# Patient Record
Sex: MALE | Race: WHITE | NOT HISPANIC OR LATINO | Employment: OTHER | URBAN - METROPOLITAN AREA
[De-identification: names, ages, dates, MRNs, and addresses within clinical notes are randomized per-mention and may not be internally consistent; named-entity substitution may affect disease eponyms.]

---

## 2017-01-25 DIAGNOSIS — E78.1 PURE HYPERGLYCERIDEMIA: ICD-10-CM

## 2017-01-25 DIAGNOSIS — E78.5 HYPERLIPIDEMIA WITH TARGET LDL LESS THAN 130: Primary | ICD-10-CM

## 2017-01-25 DIAGNOSIS — M43.6 TORTICOLLIS: ICD-10-CM

## 2017-03-07 DIAGNOSIS — Z00.00 ROUTINE GENERAL MEDICAL EXAMINATION AT A HEALTH CARE FACILITY: ICD-10-CM

## 2017-03-07 LAB
ALBUMIN SERPL-MCNC: 3.9 G/DL (ref 3.4–5)
ALP SERPL-CCNC: 61 U/L (ref 40–150)
ALT SERPL W P-5'-P-CCNC: 45 U/L (ref 0–70)
ANION GAP SERPL CALCULATED.3IONS-SCNC: 6 MMOL/L (ref 3–14)
AST SERPL W P-5'-P-CCNC: 25 U/L (ref 0–45)
BASOPHILS # BLD AUTO: 0 10E9/L (ref 0–0.2)
BASOPHILS NFR BLD AUTO: 0.2 %
BILIRUB DIRECT SERPL-MCNC: 0.2 MG/DL (ref 0–0.2)
BILIRUB SERPL-MCNC: 0.6 MG/DL (ref 0.2–1.3)
BUN SERPL-MCNC: 21 MG/DL (ref 7–30)
CALCIUM SERPL-MCNC: 9.1 MG/DL (ref 8.5–10.1)
CHLORIDE SERPL-SCNC: 105 MMOL/L (ref 94–109)
CHOLEST SERPL-MCNC: 105 MG/DL
CO2 SERPL-SCNC: 31 MMOL/L (ref 20–32)
CREAT SERPL-MCNC: 1 MG/DL (ref 0.66–1.25)
DIFFERENTIAL METHOD BLD: ABNORMAL
EOSINOPHIL # BLD AUTO: 0.3 10E9/L (ref 0–0.7)
EOSINOPHIL NFR BLD AUTO: 3 %
ERYTHROCYTE [DISTWIDTH] IN BLOOD BY AUTOMATED COUNT: 13 % (ref 10–15)
GFR SERPL CREATININE-BSD FRML MDRD: 73 ML/MIN/1.7M2
GLUCOSE SERPL-MCNC: 96 MG/DL (ref 70–99)
HBA1C MFR BLD: 5.4 % (ref 4.3–6)
HCT VFR BLD AUTO: 46.1 % (ref 40–53)
HDLC SERPL-MCNC: 54 MG/DL
HGB BLD-MCNC: 15.6 G/DL (ref 13.3–17.7)
LDLC SERPL CALC-MCNC: 39 MG/DL
LYMPHOCYTES # BLD AUTO: 3.5 10E9/L (ref 0.8–5.3)
LYMPHOCYTES NFR BLD AUTO: 40.7 %
MCH RBC QN AUTO: 33.5 PG (ref 26.5–33)
MCHC RBC AUTO-ENTMCNC: 33.8 G/DL (ref 31.5–36.5)
MCV RBC AUTO: 99 FL (ref 78–100)
MONOCYTES # BLD AUTO: 1 10E9/L (ref 0–1.3)
MONOCYTES NFR BLD AUTO: 11.8 %
NEUTROPHILS # BLD AUTO: 3.9 10E9/L (ref 1.6–8.3)
NEUTROPHILS NFR BLD AUTO: 44.3 %
NONHDLC SERPL-MCNC: 51 MG/DL
PLATELET # BLD AUTO: 169 10E9/L (ref 150–450)
POTASSIUM SERPL-SCNC: 4.2 MMOL/L (ref 3.4–5.3)
PROT SERPL-MCNC: 7.6 G/DL (ref 6.8–8.8)
PSA SERPL-ACNC: 5.13 UG/L (ref 0–4)
RBC # BLD AUTO: 4.66 10E12/L (ref 4.4–5.9)
SODIUM SERPL-SCNC: 142 MMOL/L (ref 133–144)
T4 FREE SERPL-MCNC: 0.92 NG/DL (ref 0.76–1.46)
TRIGL SERPL-MCNC: 62 MG/DL
TSH SERPL DL<=0.05 MIU/L-ACNC: 0.63 MU/L (ref 0.4–4)
WBC # BLD AUTO: 8.7 10E9/L (ref 4–11)

## 2017-03-07 PROCEDURE — 83036 HEMOGLOBIN GLYCOSYLATED A1C: CPT | Performed by: INTERNAL MEDICINE

## 2017-03-07 PROCEDURE — 80061 LIPID PANEL: CPT | Performed by: INTERNAL MEDICINE

## 2017-03-07 PROCEDURE — 84439 ASSAY OF FREE THYROXINE: CPT | Performed by: INTERNAL MEDICINE

## 2017-03-07 PROCEDURE — G0103 PSA SCREENING: HCPCS | Performed by: INTERNAL MEDICINE

## 2017-03-07 PROCEDURE — 85025 COMPLETE CBC W/AUTO DIFF WBC: CPT | Performed by: INTERNAL MEDICINE

## 2017-03-07 PROCEDURE — 84443 ASSAY THYROID STIM HORMONE: CPT | Performed by: INTERNAL MEDICINE

## 2017-03-07 PROCEDURE — 80076 HEPATIC FUNCTION PANEL: CPT | Performed by: INTERNAL MEDICINE

## 2017-03-07 PROCEDURE — 80048 BASIC METABOLIC PNL TOTAL CA: CPT | Performed by: INTERNAL MEDICINE

## 2017-03-07 PROCEDURE — 36415 COLL VENOUS BLD VENIPUNCTURE: CPT | Performed by: INTERNAL MEDICINE

## 2017-03-15 ENCOUNTER — OFFICE VISIT (OUTPATIENT)
Dept: OTHER | Facility: CLINIC | Age: 77
End: 2017-03-15
Attending: INTERNAL MEDICINE
Payer: COMMERCIAL

## 2017-03-15 VITALS
DIASTOLIC BLOOD PRESSURE: 72 MMHG | SYSTOLIC BLOOD PRESSURE: 128 MMHG | HEART RATE: 85 BPM | WEIGHT: 160 LBS | BODY MASS INDEX: 23.63 KG/M2 | OXYGEN SATURATION: 97 %

## 2017-03-15 DIAGNOSIS — R97.20 INCREASED PROSTATE SPECIFIC ANTIGEN (PSA) VELOCITY: ICD-10-CM

## 2017-03-15 DIAGNOSIS — Z00.00 ROUTINE GENERAL MEDICAL EXAMINATION AT A HEALTH CARE FACILITY: Primary | ICD-10-CM

## 2017-03-15 DIAGNOSIS — E78.5 HYPERLIPIDEMIA WITH TARGET LDL LESS THAN 130: ICD-10-CM

## 2017-03-15 PROCEDURE — 99397 PER PM REEVAL EST PAT 65+ YR: CPT | Mod: ZP | Performed by: INTERNAL MEDICINE

## 2017-03-15 PROCEDURE — 99213 OFFICE O/P EST LOW 20 MIN: CPT | Mod: ZP | Performed by: INTERNAL MEDICINE

## 2017-03-15 PROCEDURE — 99211 OFF/OP EST MAY X REQ PHY/QHP: CPT

## 2017-03-15 RX ORDER — FLUOROURACIL 50 MG/G
CREAM TOPICAL
Refills: 0 | COMMUNITY
Start: 2016-10-24

## 2017-03-15 RX ORDER — ATORVASTATIN CALCIUM 40 MG/1
40 TABLET, FILM COATED ORAL DAILY
Qty: 90 TABLET | Refills: 3 | Status: SHIPPED
Start: 2017-03-15 | End: 2019-03-12

## 2017-03-15 NOTE — PROGRESS NOTES
SUBJECTIVE:    CC: Ej Parra is a 76 year old male who presents for:       Routine general medical examination at a health care facility  Hyperlipidemia with target LDL less than 130  Increased prostate specific antigen (PSA) velocity          HISTORIES:    PROBLEM LIST:                   Patient Active Problem List   Diagnosis     Torticollis     Pure hyperglyceridemia     Hyperlipidemia with target LDL less than 130       PAST MEDICAL HISTORY:                  Past Medical History   Diagnosis Date     Allergic rhinitis, cause unspecified      Esophageal reflux      Hyperlipidemia LDL goal < 130        PAST SURGICAL HISTORY:                  Past Surgical History   Procedure Laterality Date     C nonspecific procedure  1982     appendectomy     C nonspecific procedure  1947     T&A       CURRENT MEDICATIONS:                  Current Outpatient Prescriptions   Medication Sig Dispense Refill     fluorouracil (EFUDEX) 5 % cream   0     atorvastatin (LIPITOR) 40 MG tablet Take 1 tablet (40 mg) by mouth daily 90 tablet 3     order for DME Equipment being ordered: Ankle Air Cast Splint 1 each 0     glucosamine-chondroitin 500-400 MG CAPS Take 1 capsule by mouth daily       [DISCONTINUED] atorvastatin (LIPITOR) 40 MG tablet Take 1 tablet (40 mg) by mouth daily 90 tablet 3       ALLERGIES:                  Allergies   Allergen Reactions     Niaspan [Niacin] Rash       SOCIAL HISTORY:                  Social History     Social History     Marital status:      Spouse name: Milagros     Number of children: 2     Years of education: 15     Occupational History      [employer]     worked as an  for [employer]     Social History Main Topics     Smoking status: Former Smoker     Smokeless tobacco: Never Used     Alcohol use No     Drug use: No     Sexual activity: Not on file     Other Topics Concern     Not on file     Social History Narrative       FAMILY HISTORY:                   Family  History   Problem Relation Age of Onset     Cardiovascular Father      heart valve problems,  at age 84     Circulatory Father      aortic aneurysm     CEREBROVASCULAR DISEASE Father      Cardiovascular Mother       of CHF at age 94     CEREBROVASCULAR DISEASE Mother      several     Cardiovascular Brother      valve replacement in his 70s     Arthritis Brother      Genetic Disorder Other      Leiden mutation     Genetic Disorder Other      Leiden mutation                             REVIEW OF SYSTEMS:  CONSTITUTIONAL:no malaise, fatigue, or other general symptoms  EYES: no subjective changes in visual acuity, no photophobia  ENT/MOUTH: no complaints of rhinorrhea, nasal congestion, sore throat, hearing changes  RESP:no SOB  CV: no c/o exertional chest pressure or KRAUSE  GI: No abdominal pain, constipation, change in bowel movements, nausea, pyrosis, BRBPR  :no polyuria or polydipsia, no dysuria, no gross hematuria  MUSCULOSKELATAL:no arthalgias or myalgias  INTEGUMENTARY/SKIN: no pruritis, rashes, or moles with recent change in size, shape, or pigmentation  BREAST: no lumps, masses, or discharges  NEURO: no gross sensory or motor symptoms, no dizziness, no confusion  ENDOCRINE: no polyuria or polydipsia, no heat or cold intolerance  HEME/ALLERGY/IMMUNE: no fevers, chills, night sweats, or unwanted weight loss  PSYCHIATRIC: no depression, anxiety, or internal stimuli    EXAM:    /77 (BP Location: Right arm, Patient Position: Chair, Cuff Size: Adult Large)  Pulse 85  Wt 160 lb (72.6 kg)  SpO2 97%  BMI 23.63 kg/m2  BMI: Body mass index is 23.63 kg/(m^2).  GENERAL APPEARANCE: healthy, alert and no distress   EXAM:  EYES: clear conjunctiva, no cataracts, no obvious fundoscopic abnormalities  HENT: oropharynx, nares, and TMs are WNL  NECK: no JVD, thyromegaly or lymphadenopathy, no cervical bruits  RESP: clear to auscultation without rales, wheezes, or rhonchi  CV: RRR, no murmurs, gallops, or rubs  LYMPH:  no cervical , axillary, or inguinal lymphadenopathy appreciated  GI: NABS, ND/NT, no masses or organomegally appreciated  : normal external genitalia and anus, no lumps, masses, or tenderness noted on palpation of the normally sized prostate  MS: no obvoius clinicallly relevant arthropathy, no evidence vasculitis  SKIN: no nevi clinically suspicious for malignancy are noted  NEURO: CN II-XII intact, no localizing sensory or motor abnoramlities noted, DTRs symmetrical bilaterally  PSYCH: Mental status exam reveals the pt to have normal mood and affect. There is no disorder of thought form or content. There is no response to internal stimuli. There is no suicidal or homicidal ideation.         (Z00.00) Routine general medical examination at a health care facility  (primary encounter diagnosis)  Comment: PSA increasing but needs colonosocpy so ARTURO deferred  Plan: GASTROENTEROLOGY ADULT REF PROCEDURE ONLY,         Follow-Up with Vascular Medicine, CBC with         platelets differential, T3 Free, T4 free, TSH,         Hepatic panel, Basic metabolic panel, Lipid         Profile, Hemoglobin A1c, Prostate spec antigen         screen            (E78.5) Hyperlipidemia with target LDL less than 130  Comment: at goal  Plan: atorvastatin (LIPITOR) 40 MG tablet,         OFFICE/OUTPT VISIT,EST,LEVL III, Follow-Up with        Vascular Medicine, Lipid Profile           (R97.20) Increased prostate specific antigen (PSA) velocity  Comment: PSA increasing but needs colonosocpy so ARTURO deferred, recheck below in six months, if nodule found on ARTURO at colonosocpy , then refer to   Plan: OFFICE/OUTPT VISIT,EST,LEVL III, Follow-Up with        Vascular Medicine, PSA total and free                                  Greater than one half the 15 minutes not spent on preventive care during this visit was spent providing aducation and counselling regarding item(s) # 2 onward as delineated above.     I have discussed with patient the risks,  benefits, medications, treatment options and modalities.   I have instructed the patient to call or schedule a follow-up appointment if any problems or failure to improve.

## 2017-03-15 NOTE — NURSING NOTE
"Chief Complaint   Patient presents with     RECHECK     follow up fasting labs       Initial /77 (BP Location: Right arm, Patient Position: Chair, Cuff Size: Adult Large)  Pulse 85  Wt 160 lb (72.6 kg)  SpO2 97%  BMI 23.63 kg/m2 Estimated body mass index is 23.63 kg/(m^2) as calculated from the following:    Height as of 3/3/16: 5' 9\" (1.753 m).    Weight as of this encounter: 160 lb (72.6 kg).  Medication Reconciliation: complete     Face to face nursing time: 8 minutes    Cindy Hernandez MA     "

## 2017-03-15 NOTE — MR AVS SNAPSHOT
After Visit Summary   3/15/2017    Ej Parra    MRN: [id number]           Patient Information     Date Of Birth          1940        Visit Information        Provider Department      3/15/2017 10:30 AM Zachary Aldridge MD [facility]      Today's Diagnoses     Routine general medical examination at a health care facility    -  1    Hyperlipidemia with target LDL less than 130        Increased prostate specific antigen (PSA) velocity           Follow-ups after your visit        Additional Services     GASTROENTEROLOGY ADULT REF PROCEDURE ONLY       Last Lab Result: Creatinine (mg/dL)       Date                     Value                 03/07/2017               1.00             ----------  Body mass index is 23.63 kg/(m^2).     Needed:  No  Language:  English    Patient will be contacted to schedule procedure.     Please be aware that coverage of these services is subject to the terms and limitations of your health insurance plan.  Call member services at your health plan with any benefit or coverage questions.  Any procedures must be performed at a [facility] facility OR coordinated by your clinic's referral office.    Please bring the following with you to your appointment:    (1) Any X-Rays, CTs or MRIs which have been performed.  Contact the facility where they were done to arrange for  prior to your scheduled appointment.    (2) List of current medications   (3) This referral request   (4) Any documents/labs given to you for this referral            Follow-Up with Vascular Medicine                 Who to contact     If you have questions or need follow up information about today's clinic visit or your schedule please contact [facility] directly at [phone].  Normal or non-critical lab and imaging results will be communicated to you by MyChart, letter or phone within 4  business days after the clinic has received the results. If you do not hear from us within 7 days, please contact the clinic through [employer] or phone. If you have a critical or abnormal lab result, we will notify you by phone as soon as possible.  Submit refill requests through [employer] or call your pharmacy and they will forward the refill request to us. Please allow 3 business days for your refill to be completed.          Additional Information About Your Visit        [employer] Information     [employer] gives you secure access to your electronic health record. If you see a primary care provider, you can also send messages to your care team and make appointments. If you have questions, please call your primary care clinic.  If you do not have a primary care provider, please call [phone] and they will assist you.        Care EveryWhere ID     This is your Care EveryWhere ID. This could be used by other organizations to access your [facility] medical records  [id number]        Your Vitals Were     Pulse Pulse Oximetry BMI (Body Mass Index)             85 97% 23.63 kg/m2          Blood Pressure from Last 3 Encounters:   03/15/17 128/72   03/03/16 126/76   02/01/16 138/79    Weight from Last 3 Encounters:   03/15/17 160 lb (72.6 kg)   03/03/16 161 lb (73 kg)   02/01/16 166 lb (75.3 kg)              We Performed the Following     Follow-Up with Vascular Medicine     GASTROENTEROLOGY ADULT REF PROCEDURE ONLY     OFFICE/OUTPT VISIT,EST,LEVL III          Where to get your medicines      [facility]     Phone:  [phone]     atorvastatin 40 MG tablet          Primary Care Provider Office Phone # Fax #    Zachary Aldridge [phone]       [facility] [address]  [place] [address]        Thank you!     Thank you for choosing [facility] [place]  for your care. Our goal is always to provide you with  excellent care. Hearing back from our patients is one way we can continue to improve our services. Please take a few minutes to complete the written survey that you may receive in the mail after your visit with us. Thank you!             Your Updated Medication List - Protect others around you: Learn how to safely use, store and throw away your medicines at www.disposemymeds.org.          This list is accurate as of: 3/15/17 11:59 PM.  Always use your most recent med list.                   Brand Name Dispense Instructions for use    atorvastatin 40 MG tablet    LIPITOR    90 tablet    Take 1 tablet (40 mg) by mouth daily       fluorouracil 5 % cream    EFUDEX         glucosamine-chondroitin 500-400 MG Caps per capsule      Take 1 capsule by mouth daily       order for DME     1 each    Equipment being ordered: Ankle Air Cast Splint

## 2017-04-03 ENCOUNTER — TRANSFERRED RECORDS (OUTPATIENT)
Dept: HEALTH INFORMATION MANAGEMENT | Facility: CLINIC | Age: 77
End: 2017-04-03

## 2017-08-01 LAB
ALBUMIN SERPL-MCNC: 3.7 G/DL (ref 3.4–5)
ALP SERPL-CCNC: 61 U/L (ref 45–117)
ALT SERPL-CCNC: 37 U/L (ref 13–61)
APPEARANCE UR: CLEAR
AST SERPL-CCNC: 27 U/L (ref 15–37)
BACTERIA, UR: NORMAL
BILIRUB SERPL-MCNC: 0.4 MG/DL (ref 0.2–1)
BUN SERPL-MCNC: 20 MG/DL (ref 8–26)
CALCIUM SERPL-MCNC: 9.1 MG/DL (ref 8.5–10.1)
CHOLEST SERPL-MCNC: 96 MG/DL (ref 0–200)
CREAT SERPL-MCNC: 0.9 MG/DL (ref 0.7–1.2)
GLUCOSE SERPL-MCNC: 89 MG/DL (ref 74–106)
GLUCOSE URINE: NORMAL MG/DL
HBA1C MFR BLD: 5.1 % (ref 0–5.7)
HCT VFR BLD AUTO: 44.2 % (ref 41–54)
HDLC SERPL-MCNC: 54 MG/DL
HEMOGLOBIN: 15.1 G/DL (ref 13.5–17.9)
HGB UR QL: NORMAL
KETONES UR QL: NORMAL MG/DL
LDLC SERPL CALC-MCNC: 32 MG/DL
MAGNESIUM SERPL-MCNC: 2.2 MG/DL (ref 1.6–2.6)
NITRITE UR QL STRIP: NORMAL
PLATELET # BLD AUTO: 192 10^9/L (ref 150–400)
POTASSIUM SERPL-SCNC: 4.2 MMOL/L (ref 3.5–5)
PROT SERPL-MCNC: 7.2 G/DL (ref 6.4–8.2)
PROT UR QL: NORMAL MG/DL
PSA SERPL-MCNC: 4.69 NG/ML (ref 0–4)
RBC, UR MICRO: 1 (ref ?–2)
SODIUM SERPL-SCNC: 139 MMOL/L (ref 136–145)
TRIGL SERPL-MCNC: 48 MG/DL (ref 0–150)
TSH SERPL-ACNC: 0.55 MCU/ML (ref 0.3–5)
VITAMIN D TOTAL: 36 NG/ML
WBC # BLD AUTO: 6.86 10^9/L (ref 4–11)
WBC, UR MICRO: NORMAL (ref ?–2)

## 2018-03-05 DIAGNOSIS — E78.5 HYPERLIPIDEMIA WITH TARGET LDL LESS THAN 130: ICD-10-CM

## 2018-03-05 DIAGNOSIS — Z00.00 ROUTINE GENERAL MEDICAL EXAMINATION AT A HEALTH CARE FACILITY: ICD-10-CM

## 2018-03-05 LAB
ALBUMIN SERPL-MCNC: 3.9 G/DL (ref 3.4–5)
ALP SERPL-CCNC: 58 U/L (ref 40–150)
ALT SERPL W P-5'-P-CCNC: 39 U/L (ref 0–70)
ANION GAP SERPL CALCULATED.3IONS-SCNC: 3 MMOL/L (ref 3–14)
AST SERPL W P-5'-P-CCNC: 31 U/L (ref 0–45)
BASOPHILS # BLD AUTO: 0 10E9/L (ref 0–0.2)
BASOPHILS NFR BLD AUTO: 0.5 %
BILIRUB DIRECT SERPL-MCNC: 0.1 MG/DL (ref 0–0.2)
BILIRUB SERPL-MCNC: 0.4 MG/DL (ref 0.2–1.3)
BUN SERPL-MCNC: 18 MG/DL (ref 7–30)
CALCIUM SERPL-MCNC: 9.1 MG/DL (ref 8.5–10.1)
CHLORIDE SERPL-SCNC: 104 MMOL/L (ref 94–109)
CHOLEST SERPL-MCNC: 110 MG/DL
CO2 SERPL-SCNC: 33 MMOL/L (ref 20–32)
CREAT SERPL-MCNC: 1.07 MG/DL (ref 0.66–1.25)
DIFFERENTIAL METHOD BLD: NORMAL
EOSINOPHIL # BLD AUTO: 0.2 10E9/L (ref 0–0.7)
EOSINOPHIL NFR BLD AUTO: 2.6 %
ERYTHROCYTE [DISTWIDTH] IN BLOOD BY AUTOMATED COUNT: 13 % (ref 10–15)
GFR SERPL CREATININE-BSD FRML MDRD: 67 ML/MIN/1.7M2
GLUCOSE SERPL-MCNC: 93 MG/DL (ref 70–99)
HBA1C MFR BLD: 5.6 % (ref 4.3–6)
HCT VFR BLD AUTO: 46.6 % (ref 40–53)
HDLC SERPL-MCNC: 55 MG/DL
HGB BLD-MCNC: 15.1 G/DL (ref 13.3–17.7)
LDLC SERPL CALC-MCNC: 42 MG/DL
LYMPHOCYTES # BLD AUTO: 3.7 10E9/L (ref 0.8–5.3)
LYMPHOCYTES NFR BLD AUTO: 44.4 %
MCH RBC QN AUTO: 32.1 PG (ref 26.5–33)
MCHC RBC AUTO-ENTMCNC: 32.4 G/DL (ref 31.5–36.5)
MCV RBC AUTO: 99 FL (ref 78–100)
MONOCYTES # BLD AUTO: 1 10E9/L (ref 0–1.3)
MONOCYTES NFR BLD AUTO: 11.9 %
NEUTROPHILS # BLD AUTO: 3.4 10E9/L (ref 1.6–8.3)
NEUTROPHILS NFR BLD AUTO: 40.6 %
NONHDLC SERPL-MCNC: 55 MG/DL
PLATELET # BLD AUTO: 171 10E9/L (ref 150–450)
POTASSIUM SERPL-SCNC: 4.1 MMOL/L (ref 3.4–5.3)
PROT SERPL-MCNC: 7.6 G/DL (ref 6.8–8.8)
PSA SERPL-ACNC: 4.67 UG/L (ref 0–4)
RBC # BLD AUTO: 4.7 10E12/L (ref 4.4–5.9)
SODIUM SERPL-SCNC: 140 MMOL/L (ref 133–144)
T3FREE SERPL-MCNC: 3.2 PG/ML (ref 2.3–4.2)
T4 FREE SERPL-MCNC: 0.88 NG/DL (ref 0.76–1.46)
TRIGL SERPL-MCNC: 67 MG/DL
TSH SERPL DL<=0.005 MIU/L-ACNC: 1.54 MU/L (ref 0.4–4)
WBC # BLD AUTO: 8.4 10E9/L (ref 4–11)

## 2018-03-05 PROCEDURE — 84443 ASSAY THYROID STIM HORMONE: CPT | Performed by: INTERNAL MEDICINE

## 2018-03-05 PROCEDURE — 84439 ASSAY OF FREE THYROXINE: CPT | Performed by: INTERNAL MEDICINE

## 2018-03-05 PROCEDURE — 80061 LIPID PANEL: CPT | Performed by: INTERNAL MEDICINE

## 2018-03-05 PROCEDURE — 83036 HEMOGLOBIN GLYCOSYLATED A1C: CPT | Performed by: INTERNAL MEDICINE

## 2018-03-05 PROCEDURE — 36415 COLL VENOUS BLD VENIPUNCTURE: CPT | Performed by: INTERNAL MEDICINE

## 2018-03-05 PROCEDURE — 85025 COMPLETE CBC W/AUTO DIFF WBC: CPT | Performed by: INTERNAL MEDICINE

## 2018-03-05 PROCEDURE — G0103 PSA SCREENING: HCPCS | Performed by: INTERNAL MEDICINE

## 2018-03-05 PROCEDURE — 84481 FREE ASSAY (FT-3): CPT | Performed by: INTERNAL MEDICINE

## 2018-03-05 PROCEDURE — 80048 BASIC METABOLIC PNL TOTAL CA: CPT | Performed by: INTERNAL MEDICINE

## 2018-03-05 PROCEDURE — 80076 HEPATIC FUNCTION PANEL: CPT | Performed by: INTERNAL MEDICINE

## 2018-03-12 ENCOUNTER — OFFICE VISIT (OUTPATIENT)
Dept: OTHER | Facility: CLINIC | Age: 78
End: 2018-03-12
Attending: INTERNAL MEDICINE
Payer: COMMERCIAL

## 2018-03-12 VITALS
BODY MASS INDEX: 22.65 KG/M2 | WEIGHT: 161.8 LBS | SYSTOLIC BLOOD PRESSURE: 128 MMHG | HEIGHT: 71 IN | HEART RATE: 89 BPM | DIASTOLIC BLOOD PRESSURE: 78 MMHG | OXYGEN SATURATION: 99 % | RESPIRATION RATE: 12 BRPM

## 2018-03-12 DIAGNOSIS — E78.5 HYPERLIPIDEMIA WITH TARGET LDL LESS THAN 130: ICD-10-CM

## 2018-03-12 DIAGNOSIS — Z00.00 ROUTINE GENERAL MEDICAL EXAMINATION AT A HEALTH CARE FACILITY: ICD-10-CM

## 2018-03-12 DIAGNOSIS — R97.20 INCREASED PROSTATE SPECIFIC ANTIGEN (PSA) VELOCITY: ICD-10-CM

## 2018-03-12 PROCEDURE — G0463 HOSPITAL OUTPT CLINIC VISIT: HCPCS

## 2018-03-12 PROCEDURE — 99213 OFFICE O/P EST LOW 20 MIN: CPT | Mod: ZP | Performed by: INTERNAL MEDICINE

## 2018-03-12 PROCEDURE — 99397 PER PM REEVAL EST PAT 65+ YR: CPT | Mod: ZP | Performed by: INTERNAL MEDICINE

## 2018-03-12 NOTE — MR AVS SNAPSHOT
After Visit Summary   3/12/2018    Ej Parar    MRN: [id number]           Patient Information     Date Of Birth          1940        Visit Information        Provider Department      3/12/2018 1:00 PM Zachary Aldridge MD [facility] [place]      Today's Diagnoses     Routine general medical examination at a health care facility        Hyperlipidemia with target LDL less than 130        Increased prostate specific antigen (PSA) with dcreasing PSA velocity           Follow-ups after your visit        Additional Services     Follow-Up with Vascular Medicine                 Future tests that were ordered for you today     Open Future Orders        Priority Expected Expires Ordered    Follow-Up with Vascular Medicine Routine 3/12/2019 3/12/2019 3/12/2018    CBC with platelets differential Routine 3/12/2019 3/12/2019 3/12/2018    T3 Free Routine 3/12/2019 3/12/2019 3/12/2018    T4 free Routine 3/12/2019 3/12/2019 3/12/2018    TSH Routine 3/12/2019 3/12/2019 3/12/2018    Basic metabolic panel Routine 3/12/2019 3/12/2019 3/12/2018    Hepatic panel Routine 3/12/2019 3/12/2019 3/12/2018    Lipid Profile Routine 3/12/2019 3/12/2019 3/12/2018    Hemoglobin A1c Routine 3/12/2019 3/12/2019 3/12/2018    Prostate spec antigen screen Routine 3/12/2019 3/12/2019 3/12/2018            Who to contact     If you have questions or need follow up information about today's clinic visit or your schedule please contact [facility] directly at [phone].  Normal or non-critical lab and imaging results will be communicated to you by MyChart, letter or phone within 4 business days after the clinic has received the results. If you do not hear from us within 7 days, please contact the clinic through MyChart or phone. If you have a critical or abnormal lab result, we will notify you by phone as soon as possible.  Submit refill  "requests through [employer] or call your pharmacy and they will forward the refill request to us. Please allow 3 business days for your refill to be completed.          Additional Information About Your Visit        [employer] Information     [employer] gives you secure access to your electronic health record. If you see a primary care provider, you can also send messages to your care team and make appointments. If you have questions, please call your primary care clinic.  If you do not have a primary care provider, please call [phone] and they will assist you.        Care EveryWhere ID     This is your Care EveryWhere ID. This could be used by other organizations to access your [facility] medical records  [id number]        Your Vitals Were     Pulse Respirations Height Pulse Oximetry BMI (Body Mass Index)       89 12 5' 10.5\" (1.791 m) 99% 22.89 kg/m2        Blood Pressure from Last 3 Encounters:   03/12/18 128/78   03/15/17 128/72   03/03/16 126/76    Weight from Last 3 Encounters:   03/12/18 161 lb 12.8 oz (73.4 kg)   03/15/17 160 lb (72.6 kg)   03/03/16 161 lb (73 kg)              We Performed the Following     Follow-Up with Vascular Medicine     OFFICE/OUTPT VISIT,VINNY ROPER III        Primary Care Provider Office Phone # Fax #    Zachary Aldridge [phone]       [facility] [address]  [facility] [address]        Equal Access to Services     [facility] AH: Hadii aad ku hadasho Sojacobali, waaxda luqadaha, qaybta kaalmada adeegyada, waxay santiago perales adegena correa . So [facility] [phone].    ATENCIÓN: Si habla español, tiene a hernandez disposición servicios gratuitos de asistencia lingüística. Ann al [phone].    We comply with applicable federal civil rights laws and [place] laws. We do not discriminate on the basis of race, color, national origin, age, disability, sex, sexual orientation, or gender identity.            Thank you!     Thank you for choosing [facility]  for your care. Our goal is always to provide you with excellent care. Hearing back from our patients is one way we can continue to improve our services. Please take a few minutes to complete the written survey that you may receive in the mail after your visit with us. Thank you!             Your Updated Medication List - Protect others around you: Learn how to safely use, store and throw away your medicines at www.disposemymeds.org.          This list is accurate as of 3/12/18  1:45 PM.  Always use your most recent med list.                   Brand Name Dispense Instructions for use Diagnosis    atorvastatin 40 MG tablet    LIPITOR    90 tablet    Take 1 tablet (40 mg) by mouth daily    Hyperlipidemia with target LDL less than 130       fluorouracil 5 % cream    EFUDEX          glucosamine-chondroitin 500-400 MG Caps per capsule      Take 1 capsule by mouth daily        order for DME     1 each    Equipment being ordered: Ankle Air Cast Splint    Sprain of tibiofibular ligament of right ankle, initial encounter, Right ankle pain

## 2018-03-12 NOTE — NURSING NOTE
"Chief Complaint   Patient presents with     RECHECK     follow up labs        Initial /89 (BP Location: Right arm, Patient Position: Chair, Cuff Size: Adult Large)  Pulse 89  Ht 5' 10.5\" (1.791 m)  Wt 161 lb 12.8 oz (73.4 kg)  SpO2 99%  BMI 22.89 kg/m2 Estimated body mass index is 22.89 kg/(m^2) as calculated from the following:    Height as of this encounter: 5' 10.5\" (1.791 m).    Weight as of this encounter: 161 lb 12.8 oz (73.4 kg).  Medication Reconciliation: complete    Kenya Hassan CMA on 3/12/2018 at 12:59 PM    "

## 2018-03-12 NOTE — PROGRESS NOTES
SUBJECTIVE:    CC: Ej Parra is a 77 year old male who presents for:       Routine general medical examination at a health care facility  Hyperlipidemia with target LDL less than 130  Increased prostate specific antigen (PSA) velocity          HISTORIES:    PROBLEM LIST:                   Patient Active Problem List   Diagnosis     Torticollis     Pure hyperglyceridemia     Hyperlipidemia with target LDL less than 130       PAST MEDICAL HISTORY:                  Past Medical History:   Diagnosis Date     Allergic rhinitis, cause unspecified      Esophageal reflux      Hyperlipidemia LDL goal < 130        PAST SURGICAL HISTORY:                  Past Surgical History:   Procedure Laterality Date     C NONSPECIFIC PROCEDURE      appendectomy     C NONSPECIFIC PROCEDURE      T&A       CURRENT MEDICATIONS:                  Current Outpatient Prescriptions   Medication Sig Dispense Refill     fluorouracil (EFUDEX) 5 % cream   0     atorvastatin (LIPITOR) 40 MG tablet Take 1 tablet (40 mg) by mouth daily 90 tablet 3     order for DME Equipment being ordered: Ankle Air Cast Splint 1 each 0     glucosamine-chondroitin 500-400 MG CAPS Take 1 capsule by mouth daily         ALLERGIES:                  Allergies   Allergen Reactions     Niaspan [Niacin] Rash       SOCIAL HISTORY:                  Social History     Social History     Marital status:      Spouse name: Milagros     Number of children: 2     Years of education: 15     Occupational History      [employer]     worked as an  for [employer]     Social History Main Topics     Smoking status: Former Smoker     Smokeless tobacco: Never Used     Alcohol use No     Drug use: No     Sexual activity: Not on file     Other Topics Concern     Not on file     Social History Narrative       FAMILY HISTORY:                   Family History   Problem Relation Age of Onset     Cardiovascular Father      heart valve problems,  at age 84  "    Circulatory Father      aortic aneurysm     CEREBROVASCULAR DISEASE Father      Cardiovascular Mother       of CHF at age 94     CEREBROVASCULAR DISEASE Mother      several     Cardiovascular Brother      valve replacement in his 70s     Arthritis Brother      Genetic Disorder Other      Leiden mutation/Leiden mutation                             REVIEW OF SYSTEMS:  CONSTITUTIONAL:no malaise, fatigue, or other general symptoms  EYES: no subjective changes in visual acuity, no photophobia  ENT/MOUTH: no complaints of rhinorrhea, nasal congestion, sore throat, hearing changes  RESP:no SOB  CV: no c/o exertional chest pressure or KRAUSE  GI: No abdominal pain, constipation, change in bowel movements, nausea, pyrosis, BRBPR  :no polyuria or polydipsia, no dysuria, no gross hematuria  MUSCULOSKELATAL:no arthalgias or myalgias  INTEGUMENTARY/SKIN: no pruritis, rashes, or moles with recent change in size, shape, or pigmentation  BREAST: no lumps, masses, or discharges  NEURO: no gross sensory or motor symptoms, no dizziness, no confusion  ENDOCRINE: no polyuria or polydipsia, no heat or cold intolerance  HEME/ALLERGY/IMMUNE: no fevers, chills, night sweats, or unwanted weight loss  PSYCHIATRIC: no depression, anxiety, or internal stimuli    EXAM:    /78 (BP Location: Right arm, Patient Position: Chair, Cuff Size: Adult Large)  Pulse 89  Resp 12  Ht 5' 10.5\" (1.791 m)  Wt 161 lb 12.8 oz (73.4 kg)  SpO2 99%  BMI 22.89 kg/m2  BMI: Body mass index is 22.89 kg/(m^2).  GENERAL APPEARANCE: healthy, alert and no distress   EXAM:  EYES: clear conjunctiva, no cataracts, no obvious fundoscopic abnormalities  HENT: oropharynx, nares, and TMs are WNL  NECK: no JVD, thyromegaly or lymphadenopathy, no cervical bruits  RESP: clear to auscultation without rales, wheezes, or rhonchi  CV: RRR, no murmurs, gallops, or rubs  LYMPH: no cervical , axillary, or inguinal lymphadenopathy appreciated  GI: NABS, ND/NT, no masses or " organomegally appreciated  : normal external genitalia and anus, no lumps, masses, or tenderness noted on palpation of the normally sized prostate  MS: no obvoius clinicallly relevant arthropathy, no evidence vasculitis  SKIN: no nevi clinically suspicious for malignancy are noted  NEURO: CN II-XII intact, no localizing sensory or motor abnoramlities noted, DTRs symmetrical bilaterally  PSYCH: Mental status exam reveals the pt to have normal mood and affect. There is no disorder of thought form or content. There is no response to internal stimuli. There is no suicidal or homicidal ideation.           Component      Latest Ref Rng & Units 3/7/2017 3/5/2018   WBC      4.0 - 11.0 10e9/L 8.7 8.4   RBC Count      4.4 - 5.9 10e12/L 4.66 4.70   Hemoglobin      13.3 - 17.7 g/dL 15.6 15.1   Hematocrit      40.0 - 53.0 % 46.1 46.6   MCV      78 - 100 fl 99 99   MCH      26.5 - 33.0 pg 33.5 (H) 32.1   MCHC      31.5 - 36.5 g/dL 33.8 32.4   RDW      10.0 - 15.0 % 13.0 13.0   Platelet Count      150 - 450 10e9/L 169 171   Diff Method       Automated Method Automated Method   % Neutrophils      % 44.3 40.6   % Lymphocytes      % 40.7 44.4   % Monocytes      % 11.8 11.9   % Eosinophils      % 3.0 2.6   % Basophils      % 0.2 0.5   Absolute Neutrophil      1.6 - 8.3 10e9/L 3.9 3.4   Absolute Lymphocytes      0.8 - 5.3 10e9/L 3.5 3.7   Absolute Monocytes      0.0 - 1.3 10e9/L 1.0 1.0   Absolute Eosinophils      0.0 - 0.7 10e9/L 0.3 0.2   Absolute Basophils      0.0 - 0.2 10e9/L 0.0 0.0   Sodium      133 - 144 mmol/L 142 140   Potassium      3.4 - 5.3 mmol/L 4.2 4.1   Chloride      94 - 109 mmol/L 105 104   Carbon Dioxide      20 - 32 mmol/L 31 33 (H)   Anion Gap      3 - 14 mmol/L 6 3   Glucose      70 - 99 mg/dL 96 93   Urea Nitrogen      7 - 30 mg/dL 21 18   Creatinine      0.66 - 1.25 mg/dL 1.00 1.07   GFR Estimate      >60 mL/min/1.7m2 73 67   GFR Estimate If Black      >60 mL/min/1.7m2 88 81   Calcium      8.5 - 10.1 mg/dL  9.1 9.1   Bilirubin Direct      0.0 - 0.2 mg/dL 0.2 0.1   Bilirubin Total      0.2 - 1.3 mg/dL 0.6 0.4   Albumin      3.4 - 5.0 g/dL 3.9 3.9   Protein Total      6.8 - 8.8 g/dL 7.6 7.6   Alkaline Phosphatase      40 - 150 U/L 61 58   ALT      0 - 70 U/L 45 39   AST      0 - 45 U/L 25 31   Cholesterol      <200 mg/dL 105 110   Triglycerides      <150 mg/dL 62 67   HDL Cholesterol      >39 mg/dL 54 55   LDL Cholesterol Calculated      <100 mg/dL 39 42   Non HDL Cholesterol      <130 mg/dL 51 55   T4 Free      0.76 - 1.46 ng/dL 0.92 0.88   TSH      0.40 - 4.00 mU/L 0.63 1.54   Hemoglobin A1C      4.3 - 6.0 % 5.4 5.6   PSA      0 - 4 ug/L 5.13 (H) 4.67 (H)   Free T3      2.3 - 4.2 pg/mL  3.2         (Z00.00) Routine general medical examination at a health care facility  Comment: RTC one year, no further colonoscopies needed  Plan: Follow-Up with Vascular Medicine, CBC with         platelets differential, T3 Free, T4 free, TSH,         Basic metabolic panel, Hepatic panel, Lipid         Profile, Hemoglobin A1c, Prostate spec antigen         screen            (E78.5) Hyperlipidemia with target LDL less than 130  Comment: at goal, Lipitor should be continued  Plan: OFFICE/OUTPT VISIT,JACQUELINLEVL III, Follow-Up with        Vascular Medicine, Lipid Profile           (R97.20) Increased prostate specific antigen (PSA) with dcreasing PSA velocity  Comment: this is decreasing, he declines ARTURO as he would decline treatment for prostate cancer while asx  Plan: OFFICE/OUTPT VISIT,ESTLEVL III, Follow-Up with        Vascular Medicine         Greater than one half the 15 minutes not spent on preventive care during this visit was spent providing aducation and counselling regarding item(s) # 2 onward as delineated above.                            I have discussed with patient the risks, benefits, medications, treatment options and modalities.   I have instructed the patient to call or schedule a follow-up appointment if any problems or  failure to improve.

## 2019-03-05 DIAGNOSIS — Z00.00 ROUTINE GENERAL MEDICAL EXAMINATION AT A HEALTH CARE FACILITY: ICD-10-CM

## 2019-03-05 DIAGNOSIS — E78.5 HYPERLIPIDEMIA WITH TARGET LDL LESS THAN 130: ICD-10-CM

## 2019-03-05 LAB
ALBUMIN SERPL-MCNC: 4 G/DL (ref 3.4–5)
ALP SERPL-CCNC: 61 U/L (ref 40–150)
ALT SERPL W P-5'-P-CCNC: 36 U/L (ref 0–70)
ANION GAP SERPL CALCULATED.3IONS-SCNC: 4 MMOL/L (ref 3–14)
AST SERPL W P-5'-P-CCNC: 25 U/L (ref 0–45)
BASOPHILS # BLD AUTO: 0 10E9/L (ref 0–0.2)
BASOPHILS NFR BLD AUTO: 0.4 %
BILIRUB DIRECT SERPL-MCNC: 0.2 MG/DL (ref 0–0.2)
BILIRUB SERPL-MCNC: 0.5 MG/DL (ref 0.2–1.3)
BUN SERPL-MCNC: 17 MG/DL (ref 7–30)
CALCIUM SERPL-MCNC: 9.2 MG/DL (ref 8.5–10.1)
CHLORIDE SERPL-SCNC: 106 MMOL/L (ref 94–109)
CHOLEST SERPL-MCNC: 116 MG/DL
CO2 SERPL-SCNC: 31 MMOL/L (ref 20–32)
CREAT SERPL-MCNC: 1.04 MG/DL (ref 0.66–1.25)
DIFFERENTIAL METHOD BLD: NORMAL
EOSINOPHIL # BLD AUTO: 0.2 10E9/L (ref 0–0.7)
EOSINOPHIL NFR BLD AUTO: 2.4 %
ERYTHROCYTE [DISTWIDTH] IN BLOOD BY AUTOMATED COUNT: 13.2 % (ref 10–15)
GFR SERPL CREATININE-BSD FRML MDRD: 69 ML/MIN/{1.73_M2}
GLUCOSE SERPL-MCNC: 102 MG/DL (ref 70–99)
HBA1C MFR BLD: 5.5 % (ref 0–5.6)
HCT VFR BLD AUTO: 48.7 % (ref 40–53)
HDLC SERPL-MCNC: 57 MG/DL
HGB BLD-MCNC: 15.8 G/DL (ref 13.3–17.7)
LDLC SERPL CALC-MCNC: 41 MG/DL
LYMPHOCYTES # BLD AUTO: 3.2 10E9/L (ref 0.8–5.3)
LYMPHOCYTES NFR BLD AUTO: 40.3 %
MCH RBC QN AUTO: 32.4 PG (ref 26.5–33)
MCHC RBC AUTO-ENTMCNC: 32.4 G/DL (ref 31.5–36.5)
MCV RBC AUTO: 100 FL (ref 78–100)
MONOCYTES # BLD AUTO: 1.1 10E9/L (ref 0–1.3)
MONOCYTES NFR BLD AUTO: 14.5 %
NEUTROPHILS # BLD AUTO: 3.4 10E9/L (ref 1.6–8.3)
NEUTROPHILS NFR BLD AUTO: 42.4 %
NONHDLC SERPL-MCNC: 59 MG/DL
PLATELET # BLD AUTO: 190 10E9/L (ref 150–450)
POTASSIUM SERPL-SCNC: 4.2 MMOL/L (ref 3.4–5.3)
PROT SERPL-MCNC: 7.8 G/DL (ref 6.8–8.8)
PSA SERPL-ACNC: 4.96 UG/L (ref 0–4)
RBC # BLD AUTO: 4.88 10E12/L (ref 4.4–5.9)
SODIUM SERPL-SCNC: 141 MMOL/L (ref 133–144)
T3FREE SERPL-MCNC: 3.3 PG/ML (ref 2.3–4.2)
T4 FREE SERPL-MCNC: 0.92 NG/DL (ref 0.76–1.46)
TRIGL SERPL-MCNC: 88 MG/DL
TSH SERPL DL<=0.005 MIU/L-ACNC: 2.07 MU/L (ref 0.4–4)
WBC # BLD AUTO: 7.9 10E9/L (ref 4–11)

## 2019-03-05 PROCEDURE — G0103 PSA SCREENING: HCPCS | Performed by: INTERNAL MEDICINE

## 2019-03-05 PROCEDURE — 85025 COMPLETE CBC W/AUTO DIFF WBC: CPT | Performed by: INTERNAL MEDICINE

## 2019-03-05 PROCEDURE — 80061 LIPID PANEL: CPT | Performed by: INTERNAL MEDICINE

## 2019-03-05 PROCEDURE — 83036 HEMOGLOBIN GLYCOSYLATED A1C: CPT | Performed by: INTERNAL MEDICINE

## 2019-03-05 PROCEDURE — 36415 COLL VENOUS BLD VENIPUNCTURE: CPT | Performed by: INTERNAL MEDICINE

## 2019-03-05 PROCEDURE — 84443 ASSAY THYROID STIM HORMONE: CPT | Performed by: INTERNAL MEDICINE

## 2019-03-05 PROCEDURE — 84439 ASSAY OF FREE THYROXINE: CPT | Performed by: INTERNAL MEDICINE

## 2019-03-05 PROCEDURE — 80076 HEPATIC FUNCTION PANEL: CPT | Performed by: INTERNAL MEDICINE

## 2019-03-05 PROCEDURE — 84481 FREE ASSAY (FT-3): CPT | Performed by: INTERNAL MEDICINE

## 2019-03-05 PROCEDURE — 80048 BASIC METABOLIC PNL TOTAL CA: CPT | Performed by: INTERNAL MEDICINE

## 2019-03-12 ENCOUNTER — OFFICE VISIT (OUTPATIENT)
Dept: OTHER | Facility: CLINIC | Age: 79
End: 2019-03-12
Attending: INTERNAL MEDICINE
Payer: COMMERCIAL

## 2019-03-12 VITALS
BODY MASS INDEX: 23.62 KG/M2 | SYSTOLIC BLOOD PRESSURE: 129 MMHG | HEIGHT: 70 IN | OXYGEN SATURATION: 95 % | DIASTOLIC BLOOD PRESSURE: 73 MMHG | HEART RATE: 81 BPM | WEIGHT: 165 LBS

## 2019-03-12 DIAGNOSIS — Z00.00 ROUTINE GENERAL MEDICAL EXAMINATION AT A HEALTH CARE FACILITY: Primary | ICD-10-CM

## 2019-03-12 DIAGNOSIS — E78.5 HYPERLIPIDEMIA WITH TARGET LDL LESS THAN 130: ICD-10-CM

## 2019-03-12 DIAGNOSIS — R73.01 IFG (IMPAIRED FASTING GLUCOSE): ICD-10-CM

## 2019-03-12 DIAGNOSIS — R97.20 INCREASED PROSTATE SPECIFIC ANTIGEN (PSA) VELOCITY: ICD-10-CM

## 2019-03-12 DIAGNOSIS — Z12.5 SCREENING FOR PROSTATE CANCER: ICD-10-CM

## 2019-03-12 PROCEDURE — G0439 PPPS, SUBSEQ VISIT: HCPCS | Mod: ZP | Performed by: INTERNAL MEDICINE

## 2019-03-12 PROCEDURE — G0463 HOSPITAL OUTPT CLINIC VISIT: HCPCS

## 2019-03-12 RX ORDER — ATORVASTATIN CALCIUM 40 MG/1
40 TABLET, FILM COATED ORAL DAILY
Qty: 90 TABLET | Refills: 3 | Status: SHIPPED | OUTPATIENT
Start: 2019-03-12 | End: 2020-04-06

## 2019-03-12 ASSESSMENT — MIFFLIN-ST. JEOR: SCORE: 1484.69

## 2019-03-12 NOTE — PROGRESS NOTES
"Ej Parra is a 78 year old male who presents for:  Chief Complaint   Patient presents with     RECHECK     follow up labs - scheduled 3/5/19         Vitals:    Vitals:    03/12/19 1010 03/12/19 1012   BP: 139/78 139/73   BP Location: Right arm Left arm   Patient Position: Chair Chair   Cuff Size: Adult Regular Adult Regular   Pulse: 81    SpO2: 95%    Weight: 165 lb (74.8 kg)    Height: 5' 10\" (1.778 m)        BMI:  Estimated body mass index is 23.68 kg/m  as calculated from the following:    Height as of this encounter: 5' 10\" (1.778 m).    Weight as of this encounter: 165 lb (74.8 kg).    Pain Score:  Data Unavailable        Thony Workman    "

## 2019-03-12 NOTE — PROGRESS NOTES
Ej Parra is a 78 year old male who presents for        Routine general medical examination at a health care facility  Hyperlipidemia with target LDL less than 130  Increased prostate specific antigen (PSA) velocity  IFG (impaired fasting glucose)  Screening for prostate cancer     Current providers caring for this patient include:  Patient Care Team:  Zachary Aldridge MD as PCP - General    Complete Medical and Social history reviewed with patient, outlined below.    Patient Active Problem List   Diagnosis     Torticollis     Pure hyperglyceridemia     Hyperlipidemia with target LDL less than 130       Past Medical History:   Diagnosis Date     Allergic rhinitis, cause unspecified      Esophageal reflux      Hyperlipidemia LDL goal < 130        Past Surgical History:   Procedure Laterality Date     C NONSPECIFIC PROCEDURE      appendectomy     C NONSPECIFIC PROCEDURE      T&A       Family History   Problem Relation Age of Onset     Cardiovascular Father         heart valve problems,  at age 84     Circulatory Father         aortic aneurysm     Cerebrovascular Disease Father      Cardiovascular Mother          of CHF at age 94     Cerebrovascular Disease Mother         several     Cardiovascular Brother         valve replacement in his 70s     Arthritis Brother      Genetic Disorder Other         Leiden mutation/Leiden mutation       Social History     Tobacco Use     Smoking status: Former Smoker     Smokeless tobacco: Never Used   Substance Use Topics     Alcohol use: No         Review Of Systems  Skin: negative  Eyes: negative  Ears/Nose/Throat: negative  Respiratory: No shortness of breath, dyspnea on exertion, cough, or hemoptysis  Cardiovascular: negative  Gastrointestinal: negative  Genitourinary: negative  Musculoskeletal: negative  Neurologic: negative  Psychiatric: negative  Hematologic/Lymphatic/Immunologic: negative  Endocrine: negative     Diet: regular, low salt/low  "fat  Physical Activity: active without specific exercise program  Depression Screen:    Over the past 2 weeks, patient has felt down, depressed, or hopeless:  No    Over the past 2 weeks, patient has felt little interest or pleasure in doing things: No    Functional ability/Safety screen:  Up and go test (able to get up and walk longer than 30 seconds): Passed  Patient needs assistance with: nothing  Patient's home has the following possible safety concerns: none identified  Patient has concerns about his hearing:  No  Cognitive Screen  Patient repeats three objects (ball, flag, tree)      Clock drawing test:   NORMAL  Recalls three objects after 3 minutes (ball,flag,tree):                                                                                               recalls 3 objects (3 points)    Physical Exam:  /73 (BP Location: Left arm, Patient Position: Chair, Cuff Size: Adult Regular)   Pulse 81   Ht 5' 10\" (1.778 m)   Wt 165 lb (74.8 kg)   SpO2 95%   BMI 23.68 kg/m     Body mass index is 23.68 kg/m .                  GENERAL APPEARANCE: healthy, alert and no distress  PSYCH: Affect normal/bright.  Mentation within normal limits.  EYES: conjunctiva clear  HENT: ear canals and TM's normal.  Nose and mouth without ulcers, erythema or lesions  NECK: supple, nontender, no lymphadenopathy  RESP: lungs clear to auscultation - no rales, rhonchi or wheezes  CV: regular rates and rhythm, normal S1 S2, no murmur noted  ABDOMEN:  soft, nontender, no HSM or masses and bowel sounds normal  SKIN: no suspicious lesions or rashes  NEURO: Normal strength and tone,  normal speech and mentation  Rectal exam: prostate 1+ enlarged without nodules  Extremities: no peripheral edema or tenderness, peripheral pulses normal  MS: extremities normal- no gross deformities noted, no erythema, FROM noted in all extremities  PSYCH: mentation appears normal  LYMPHATICS: no cervical adenopathy    End of Life Planning:   Patient " currently has an advanced directive: Yes.  Practitioner is supportive of decision.    Education/Counseling:   Based on review of the above information, the following items were addressed:      IFG -  access to diabetes self-management training, medical nutrition therapy, and treatment    Appropriate preventive services were discussed with this patient, including applicable screening as appropriate for cardiovascular disease, diabetes, osteopenia/osteoporosis, and glaucoma.  As appropriate for age/gender, discussed screening for colorectal cancer, prostate cancer, breast cancer, and cervical cancer.   Checklist reviewing preventive services available has been given to the patient.                   Component      Latest Ref Rng & Units 3/5/2018 3/5/2019   WBC      4.0 - 11.0 10e9/L 8.4 7.9   RBC Count      4.4 - 5.9 10e12/L 4.70 4.88   Hemoglobin      13.3 - 17.7 g/dL 15.1 15.8   Hematocrit      40.0 - 53.0 % 46.6 48.7   MCV      78 - 100 fl 99 100   MCH      26.5 - 33.0 pg 32.1 32.4   MCHC      31.5 - 36.5 g/dL 32.4 32.4   RDW      10.0 - 15.0 % 13.0 13.2   Platelet Count      150 - 450 10e9/L 171 190   Diff Method       Automated Method Automated Method   % Neutrophils      % 40.6 42.4   % Lymphocytes      % 44.4 40.3   % Monocytes      % 11.9 14.5   % Eosinophils      % 2.6 2.4   % Basophils      % 0.5 0.4   Absolute Neutrophil      1.6 - 8.3 10e9/L 3.4 3.4   Absolute Lymphocytes      0.8 - 5.3 10e9/L 3.7 3.2   Absolute Monocytes      0.0 - 1.3 10e9/L 1.0 1.1   Absolute Eosinophils      0.0 - 0.7 10e9/L 0.2 0.2   Absolute Basophils      0.0 - 0.2 10e9/L 0.0 0.0   Sodium      133 - 144 mmol/L 140 141   Potassium      3.4 - 5.3 mmol/L 4.1 4.2   Chloride      94 - 109 mmol/L 104 106   Carbon Dioxide      20 - 32 mmol/L 33 (H) 31   Anion Gap      3 - 14 mmol/L 3 4   Glucose      70 - 99 mg/dL 93 102 (H)   Urea Nitrogen      7 - 30 mg/dL 18 17   Creatinine      0.66 - 1.25 mg/dL 1.07 1.04   GFR Estimate      >60  mL/min/1.73:m2 67 69   GFR Estimate If Black      >60 mL/min/1.73:m2 81 80   Calcium      8.5 - 10.1 mg/dL 9.1 9.2   Bilirubin Direct      0.0 - 0.2 mg/dL 0.1 0.2   Bilirubin Total      0.2 - 1.3 mg/dL 0.4 0.5   Albumin      3.4 - 5.0 g/dL 3.9 4.0   Protein Total      6.8 - 8.8 g/dL 7.6 7.8   Alkaline Phosphatase      40 - 150 U/L 58 61   ALT      0 - 70 U/L 39 36   AST      0 - 45 U/L 31 25   Cholesterol      <200 mg/dL 110 116   Triglycerides      <150 mg/dL 67 88   HDL Cholesterol      >39 mg/dL 55 57   LDL Cholesterol Calculated      <100 mg/dL 42 41   Non HDL Cholesterol      <130 mg/dL 55 59   Free T3      2.3 - 4.2 pg/mL 3.2 3.3   T4 Free      0.76 - 1.46 ng/dL 0.88 0.92   TSH      0.40 - 4.00 mU/L 1.54 2.07   Hemoglobin A1C      0 - 5.6 % 5.6 5.5   PSA      0 - 4 ug/L 4.67 (H) 4.96 (H)     Component      Latest Ref Rng & Units 4/24/2003 5/21/2004 7/5/2005 10/10/2006   PSA      0 - 4 ug/L 1.7 1.27 2.29 1.24     Component      Latest Ref Rng & Units 9/17/2007 11/25/2008 1/12/2010 1/21/2011   PSA      0 - 4 ug/L 1.76 3.52 2.41 2.77     Component      Latest Ref Rng & Units 2/6/2012 2/10/2014 5/14/2014 1/30/2015   PSA      0 - 4 ug/L 2.49 5.15 (H) 3.38 3.83     Component      Latest Ref Rng & Units 2/23/2016 3/7/2017 8/1/2017 3/5/2018 3/5/2019   PSA      0 - 4 ug/L 3.90 5.13 (H) 4.69 (H) 4.67 (H) 4.96 (H)         (Z00.00) Routine general medical examination at a health care facility  (primary encounter diagnosis)  Comment: RTC one year for annual exam   Plan: OFFICE/OUTPT VISIT,ESTVINNY III, Follow-Up with        Vascular Medicine, CBC with platelets         differential, T3 Free, T4 free, TSH, Basic         metabolic panel, Hepatic panel, Lipid Profile,         Hemoglobin A1c, Albumin Random Urine         Quantitative with Creat Ratio, Prostate spec         antigen screen            (E78.5) Hyperlipidemia with target LDL less than 130  Comment: at goal  Plan: atorvastatin (LIPITOR) 40 MG tablet,          OFFICE/OUTPT VISIT,EST,LEVL III, Lipid Profile           (R97.20) Increased prostate specific antigen (PSA) with dcreasing PSA velocity  Comment: PSA is likely normal for his age. I do not feel a nodule on ARTURO. He will likely live < 25 years. For now, I would simply monitor the PSA annually, if velocity increases further, I would then consider  referral.  Plan: OFFICE/OUTPT VISIT,EST,LEVL III, Prostate spec         antigen screen            (R73.01) IFG (impaired fasting glucose)  Comment: avoid CHO  Plan: OFFICE/OUTPT VISIT,EST,LEVL III, Hemoglobin         A1c, Albumin Random Urine Quantitative with         Creat Ratio           (Z12.5) Screening for prostate cancer  Comment: as above  Plan: OFFICE/OUTPT VISIT,EST,LEVL III, Prostate spec         antigen screen           Greater than one half the 15 minutes not spent on preventive care during this visit was spent providing aducation and counselling regarding item(s) # 2 onward as delineated above.        Greater than one half the 15 minutes not spent on preventive care during this visit was spent providing aducation and counselling regarding item(s) # 2 onward as delineated above.

## 2019-10-02 ENCOUNTER — HEALTH MAINTENANCE LETTER (OUTPATIENT)
Age: 79
End: 2019-10-02

## 2020-05-07 DIAGNOSIS — Z12.5 SCREENING FOR PROSTATE CANCER: ICD-10-CM

## 2020-05-07 DIAGNOSIS — R73.01 IFG (IMPAIRED FASTING GLUCOSE): ICD-10-CM

## 2020-05-07 DIAGNOSIS — R97.20 INCREASED PROSTATE SPECIFIC ANTIGEN (PSA) VELOCITY: ICD-10-CM

## 2020-05-07 DIAGNOSIS — E78.5 HYPERLIPIDEMIA WITH TARGET LDL LESS THAN 130: ICD-10-CM

## 2020-05-07 DIAGNOSIS — Z00.00 ROUTINE GENERAL MEDICAL EXAMINATION AT A HEALTH CARE FACILITY: ICD-10-CM

## 2020-05-07 LAB
BASOPHILS # BLD AUTO: 0 10E9/L (ref 0–0.2)
BASOPHILS NFR BLD AUTO: 0.2 %
DIFFERENTIAL METHOD BLD: NORMAL
EOSINOPHIL # BLD AUTO: 0.2 10E9/L (ref 0–0.7)
EOSINOPHIL NFR BLD AUTO: 2.1 %
ERYTHROCYTE [DISTWIDTH] IN BLOOD BY AUTOMATED COUNT: 13.1 % (ref 10–15)
HBA1C MFR BLD: 5.5 % (ref 0–5.6)
HCT VFR BLD AUTO: 45.9 % (ref 40–53)
HGB BLD-MCNC: 14.9 G/DL (ref 13.3–17.7)
LYMPHOCYTES # BLD AUTO: 3.5 10E9/L (ref 0.8–5.3)
LYMPHOCYTES NFR BLD AUTO: 44 %
MCH RBC QN AUTO: 32.3 PG (ref 26.5–33)
MCHC RBC AUTO-ENTMCNC: 32.5 G/DL (ref 31.5–36.5)
MCV RBC AUTO: 99 FL (ref 78–100)
MONOCYTES # BLD AUTO: 0.9 10E9/L (ref 0–1.3)
MONOCYTES NFR BLD AUTO: 11.7 %
NEUTROPHILS # BLD AUTO: 3.4 10E9/L (ref 1.6–8.3)
NEUTROPHILS NFR BLD AUTO: 42 %
PLATELET # BLD AUTO: 188 10E9/L (ref 150–450)
RBC # BLD AUTO: 4.62 10E12/L (ref 4.4–5.9)
T3FREE SERPL-MCNC: 3.1 PG/ML (ref 2.3–4.2)
WBC # BLD AUTO: 8.1 10E9/L (ref 4–11)

## 2020-05-07 PROCEDURE — 84439 ASSAY OF FREE THYROXINE: CPT | Performed by: INTERNAL MEDICINE

## 2020-05-07 PROCEDURE — 82043 UR ALBUMIN QUANTITATIVE: CPT | Performed by: INTERNAL MEDICINE

## 2020-05-07 PROCEDURE — 85025 COMPLETE CBC W/AUTO DIFF WBC: CPT | Performed by: INTERNAL MEDICINE

## 2020-05-07 PROCEDURE — 36415 COLL VENOUS BLD VENIPUNCTURE: CPT | Performed by: INTERNAL MEDICINE

## 2020-05-07 PROCEDURE — 80061 LIPID PANEL: CPT | Performed by: INTERNAL MEDICINE

## 2020-05-07 PROCEDURE — 84481 FREE ASSAY (FT-3): CPT | Performed by: INTERNAL MEDICINE

## 2020-05-07 PROCEDURE — 84443 ASSAY THYROID STIM HORMONE: CPT | Performed by: INTERNAL MEDICINE

## 2020-05-07 PROCEDURE — 80076 HEPATIC FUNCTION PANEL: CPT | Performed by: INTERNAL MEDICINE

## 2020-05-07 PROCEDURE — G0103 PSA SCREENING: HCPCS | Performed by: INTERNAL MEDICINE

## 2020-05-07 PROCEDURE — 80048 BASIC METABOLIC PNL TOTAL CA: CPT | Performed by: INTERNAL MEDICINE

## 2020-05-07 PROCEDURE — 83036 HEMOGLOBIN GLYCOSYLATED A1C: CPT | Performed by: INTERNAL MEDICINE

## 2020-05-08 LAB
ALBUMIN SERPL-MCNC: 3.5 G/DL (ref 3.4–5)
ALP SERPL-CCNC: 62 U/L (ref 40–150)
ALT SERPL W P-5'-P-CCNC: 45 U/L (ref 0–70)
ANION GAP SERPL CALCULATED.3IONS-SCNC: 5 MMOL/L (ref 3–14)
AST SERPL W P-5'-P-CCNC: 26 U/L (ref 0–45)
BILIRUB DIRECT SERPL-MCNC: 0.2 MG/DL (ref 0–0.2)
BILIRUB SERPL-MCNC: 0.7 MG/DL (ref 0.2–1.3)
BUN SERPL-MCNC: 24 MG/DL (ref 7–30)
CALCIUM SERPL-MCNC: 8.7 MG/DL (ref 8.5–10.1)
CHLORIDE SERPL-SCNC: 108 MMOL/L (ref 94–109)
CHOLEST SERPL-MCNC: 110 MG/DL
CO2 SERPL-SCNC: 29 MMOL/L (ref 20–32)
CREAT SERPL-MCNC: 0.9 MG/DL (ref 0.66–1.25)
CREAT UR-MCNC: 187 MG/DL
GFR SERPL CREATININE-BSD FRML MDRD: 81 ML/MIN/{1.73_M2}
GLUCOSE SERPL-MCNC: 90 MG/DL (ref 70–99)
HDLC SERPL-MCNC: 50 MG/DL
LDLC SERPL CALC-MCNC: 48 MG/DL
MICROALBUMIN UR-MCNC: 13 MG/L
MICROALBUMIN/CREAT UR: 7.17 MG/G CR (ref 0–17)
NONHDLC SERPL-MCNC: 60 MG/DL
POTASSIUM SERPL-SCNC: 3.9 MMOL/L (ref 3.4–5.3)
PROT SERPL-MCNC: 7.6 G/DL (ref 6.8–8.8)
PSA SERPL-ACNC: 4.54 UG/L (ref 0–4)
SODIUM SERPL-SCNC: 142 MMOL/L (ref 133–144)
T4 FREE SERPL-MCNC: 0.88 NG/DL (ref 0.76–1.46)
TRIGL SERPL-MCNC: 61 MG/DL
TSH SERPL DL<=0.005 MIU/L-ACNC: 0.72 MU/L (ref 0.4–4)

## 2020-05-26 ENCOUNTER — VIRTUAL VISIT (OUTPATIENT)
Dept: OTHER | Facility: CLINIC | Age: 80
End: 2020-05-26
Attending: INTERNAL MEDICINE
Payer: COMMERCIAL

## 2020-05-26 VITALS — DIASTOLIC BLOOD PRESSURE: 69 MMHG | WEIGHT: 158 LBS | BODY MASS INDEX: 22.67 KG/M2 | SYSTOLIC BLOOD PRESSURE: 133 MMHG

## 2020-05-26 DIAGNOSIS — R73.01 IFG (IMPAIRED FASTING GLUCOSE): ICD-10-CM

## 2020-05-26 DIAGNOSIS — E78.5 HYPERLIPIDEMIA WITH TARGET LDL LESS THAN 130: Primary | ICD-10-CM

## 2020-05-26 DIAGNOSIS — Z12.5 SCREENING FOR PROSTATE CANCER: ICD-10-CM

## 2020-05-26 PROCEDURE — 99213 OFFICE O/P EST LOW 20 MIN: CPT | Mod: ZP | Performed by: INTERNAL MEDICINE

## 2020-05-26 NOTE — PROGRESS NOTES
Ej Parra is a 80 year old male who is presenting at the current time to discuss his diagnosi(es) of        Hyperlipidemia with target LDL less than 130  IFG (impaired fasting glucose)  Screening for prostate cancer .    HPI: Ej Parra is a 80 year old male with elevated PSA without nodules on ARTURO, IFG, and hyperlipidemia here for f/u of labs.    Review Of Systems  Skin: negative  Eyes: negative  Ears/Nose/Throat: negative  Respiratory: No shortness of breath, dyspnea on exertion, cough, or hemoptysis  Cardiovascular: negative  Gastrointestinal: negative  Genitourinary: negative  Musculoskeletal: negative  Neurologic: negative  Psychiatric: negative  Hematologic/Lymphatic/Immunologic: negative  Endocrine: negative        PAST MEDICAL HISTORY:                  Past Medical History:   Diagnosis Date     Allergic rhinitis, cause unspecified      Esophageal reflux      Hyperlipidemia LDL goal < 130        PAST SURGICAL HISTORY:                  Past Surgical History:   Procedure Laterality Date     C NONSPECIFIC PROCEDURE  1982    appendectomy     C NONSPECIFIC PROCEDURE  1947    T&A       CURRENT MEDICATIONS:                  Current Outpatient Medications   Medication Sig Dispense Refill     atorvastatin (LIPITOR) 40 MG tablet Take 1 tablet (40 mg) by mouth daily 90 tablet 3     fluorouracil (EFUDEX) 5 % cream   0     glucosamine-chondroitin 500-400 MG CAPS Take 1 capsule by mouth daily       order for DME Equipment being ordered: Ankle Air Cast Splint 1 each 0       ALLERGIES:                  Allergies   Allergen Reactions     Niaspan [Niacin] Rash       SOCIAL HISTORY:                  Social History     Socioeconomic History     Marital status:      Spouse name: Milagros     Number of children: 2     Years of education: 15     Highest education level: Not on file   Occupational History     Employer: [employer]     Comment: worked as an  for [employer]   Social Needs      Financial resource strain: Not on file     Food insecurity     Worry: Not on file     Inability: Not on file     Transportation needs     Medical: Not on file     Non-medical: Not on file   Tobacco Use     Smoking status: Former Smoker     Smokeless tobacco: Never Used   Substance and Sexual Activity     Alcohol use: No     Drug use: No     Sexual activity: Not on file   Lifestyle     Physical activity     Days per week: Not on file     Minutes per session: Not on file     Stress: Not on file   Relationships     Social connections     Talks on phone: Not on file     Gets together: Not on file     Attends Shinto service: Not on file     Active member of club or organization: Not on file     Attends meetings of clubs or organizations: Not on file     Relationship status: Not on file     Intimate partner violence     Fear of current or ex partner: Not on file     Emotionally abused: Not on file     Physically abused: Not on file     Forced sexual activity: Not on file   Other Topics Concern     Parent/sibling w/ CABG, MI or angioplasty before 65F 55M? Not Asked   Social History Narrative     Not on file       FAMILY HISTORY:                   Family History   Problem Relation Age of Onset     Cardiovascular Father         heart valve problems,  at age 84     Circulatory Father         aortic aneurysm     Cerebrovascular Disease Father      Cardiovascular Mother          of CHF at age 94     Cerebrovascular Disease Mother         several     Cardiovascular Brother         valve replacement in his 70s     Arthritis Brother      Genetic Disorder Other         Leiden mutation/Leiden mutation       Physical exam was not performed as this is a [facility] coronavirus mediated video visit    Component      Latest Ref Rng & Units 3/5/2019 2020   WBC      4.0 - 11.0 10e9/L 7.9 8.1   RBC Count      4.4 - 5.9 10e12/L 4.88 4.62   Hemoglobin      13.3 - 17.7 g/dL 15.8 14.9   Hematocrit      40.0 - 53.0 % 48.7 45.9   MCV       78 - 100 fl 100 99   MCH      26.5 - 33.0 pg 32.4 32.3   MCHC      31.5 - 36.5 g/dL 32.4 32.5   RDW      10.0 - 15.0 % 13.2 13.1   Platelet Count      150 - 450 10e9/L 190 188   % Neutrophils      % 42.4 42.0   % Lymphocytes      % 40.3 44.0   % Monocytes      % 14.5 11.7   % Eosinophils      % 2.4 2.1   % Basophils      % 0.4 0.2   Absolute Neutrophil      1.6 - 8.3 10e9/L 3.4 3.4   Absolute Lymphocytes      0.8 - 5.3 10e9/L 3.2 3.5   Absolute Monocytes      0.0 - 1.3 10e9/L 1.1 0.9   Absolute Eosinophils      0.0 - 0.7 10e9/L 0.2 0.2   Absolute Basophils      0.0 - 0.2 10e9/L 0.0 0.0   Diff Method       Automated Method Automated Method   Sodium      133 - 144 mmol/L 141 142   Potassium      3.4 - 5.3 mmol/L 4.2 3.9   Chloride      94 - 109 mmol/L 106 108   Carbon Dioxide      20 - 32 mmol/L 31 29   Anion Gap      3 - 14 mmol/L 4 5   Glucose      70 - 99 mg/dL 102 (H) 90   Urea Nitrogen      7 - 30 mg/dL 17 24   Creatinine      0.66 - 1.25 mg/dL 1.04 0.90   GFR Estimate      >60 mL/min/1.73:m2 69 81   GFR Estimate If Black      >60 mL/min/1.73:m2 80 >90   Calcium      8.5 - 10.1 mg/dL 9.2 8.7   Bilirubin Direct      0.0 - 0.2 mg/dL 0.2 0.2   Bilirubin Total      0.2 - 1.3 mg/dL 0.5 0.7   Albumin      3.4 - 5.0 g/dL 4.0 3.5   Protein Total      6.8 - 8.8 g/dL 7.8 7.6   Alkaline Phosphatase      40 - 150 U/L 61 62   ALT      0 - 70 U/L 36 45   AST      0 - 45 U/L 25 26   Cholesterol      <200 mg/dL 116 110   Triglycerides      <150 mg/dL 88 61   HDL Cholesterol      >39 mg/dL 57 50   LDL Cholesterol Calculated      <100 mg/dL 41 48   Non HDL Cholesterol      <130 mg/dL 59 60   Creatinine Urine      mg/dL  187   Albumin Urine mg/L      mg/L  13   Albumin Urine mg/g Cr      0 - 17 mg/g Cr  7.17   PSA      0 - 4 ug/L 4.96 (H) 4.54 (H)   Hemoglobin A1C      0 - 5.6 % 5.5 5.5   TSH      0.40 - 4.00 mU/L 2.07 0.72   T4 Free      0.76 - 1.46 ng/dL 0.92 0.88   Free T3      2.3 - 4.2 pg/mL 3.3 3.1       A/P:    (E78.5)  Hyperlipidemia with target LDL less than 130  (primary encounter diagnosis)  Comment: at goal  Plan: T3 Free, T4 free, TSH, Lipid Profile           (R73.01) IFG (impaired fasting glucose)  Comment: at goal  Plan: CBC with platelets differential, Hepatic panel,        Hemoglobin A1c            (Z12.5) Screening for prostate cancer  Comment: PSA declining, monitor annually   Plan: Prostate spec antigen screen           Greater than one half the fifteen minutes total spent on the pt's visit were spent providing education and counselling to the patient regarding the above matters.      Video start: 13:39  Video end: 13:54

## 2020-05-26 NOTE — PROGRESS NOTES
"Ej Parra is a 80 year old male who is being evaluated via a billable video visit.      The patient has been notified of following:     \"This video visit will be conducted via a call between you and your physician/provider. We have found that certain health care needs can be provided without the need for an in-person physical exam.  This service lets us provide the care you need with a video conversation.  If a prescription is necessary we can send it directly to your pharmacy.  If lab work is needed we can place an order for that and you can then stop by our lab to have the test done at a later time.    Video visits are billed at different rates depending on your insurance coverage.  Please reach out to your insurance provider with any questions.    If during the course of the call the physician/provider feels a video visit is not appropriate, you will not be charged for this service.\"    Patient has given verbal consent for Video visit? Yes, cell phone number [phone]    How would you like to obtain your AVS? Mailed       "

## 2020-05-27 NOTE — PATIENT INSTRUCTIONS
Ej,    As discussed at your virtual appointment with Dr. Aldridge on 5/27/20, Dr. Aldridge would like to see you for an in-person physical exam in March of 2021.    If you have any questions, please feel free to contact me through [employer] or by calling [phone].    Joanna Loza RN BSN  For Dr. Zachary Aldridge  [facility]

## 2021-01-15 ENCOUNTER — HEALTH MAINTENANCE LETTER (OUTPATIENT)
Age: 81
End: 2021-01-15

## 2021-01-28 ENCOUNTER — IMMUNIZATION (OUTPATIENT)
Dept: NURSING | Facility: CLINIC | Age: 81
End: 2021-01-28
Payer: COMMERCIAL

## 2021-01-28 PROCEDURE — 0001A PR COVID VAC PFIZER DIL RECON 30 MCG/0.3 ML IM: CPT

## 2021-01-28 PROCEDURE — 91300 PR COVID VAC PFIZER DIL RECON 30 MCG/0.3 ML IM: CPT

## 2021-02-18 ENCOUNTER — IMMUNIZATION (OUTPATIENT)
Dept: NURSING | Facility: CLINIC | Age: 81
End: 2021-02-18
Attending: INTERNAL MEDICINE
Payer: COMMERCIAL

## 2021-02-18 PROCEDURE — 0002A PR COVID VAC PFIZER DIL RECON 30 MCG/0.3 ML IM: CPT

## 2021-02-18 PROCEDURE — 91300 PR COVID VAC PFIZER DIL RECON 30 MCG/0.3 ML IM: CPT

## 2021-04-07 DIAGNOSIS — E78.5 HYPERLIPIDEMIA WITH TARGET LDL LESS THAN 130: ICD-10-CM

## 2021-04-07 DIAGNOSIS — Z12.5 SCREENING FOR PROSTATE CANCER: ICD-10-CM

## 2021-04-07 DIAGNOSIS — R73.01 IFG (IMPAIRED FASTING GLUCOSE): ICD-10-CM

## 2021-04-07 LAB
ALBUMIN SERPL-MCNC: 3.8 G/DL (ref 3.4–5)
ALP SERPL-CCNC: 54 U/L (ref 40–150)
ALT SERPL W P-5'-P-CCNC: 42 U/L (ref 0–70)
AST SERPL W P-5'-P-CCNC: 25 U/L (ref 0–45)
BASOPHILS # BLD AUTO: 0 10E9/L (ref 0–0.2)
BASOPHILS NFR BLD AUTO: 0.2 %
BILIRUB DIRECT SERPL-MCNC: 0.1 MG/DL (ref 0–0.2)
BILIRUB SERPL-MCNC: 0.6 MG/DL (ref 0.2–1.3)
CHOLEST SERPL-MCNC: 107 MG/DL
DIFFERENTIAL METHOD BLD: NORMAL
EOSINOPHIL # BLD AUTO: 0.2 10E9/L (ref 0–0.7)
EOSINOPHIL NFR BLD AUTO: 2.8 %
ERYTHROCYTE [DISTWIDTH] IN BLOOD BY AUTOMATED COUNT: 13 % (ref 10–15)
HBA1C MFR BLD: 5.5 % (ref 0–5.6)
HCT VFR BLD AUTO: 44.9 % (ref 40–53)
HDLC SERPL-MCNC: 59 MG/DL
HGB BLD-MCNC: 14.8 G/DL (ref 13.3–17.7)
LDLC SERPL CALC-MCNC: 40 MG/DL
LYMPHOCYTES # BLD AUTO: 3.5 10E9/L (ref 0.8–5.3)
LYMPHOCYTES NFR BLD AUTO: 40.5 %
MCH RBC QN AUTO: 32.7 PG (ref 26.5–33)
MCHC RBC AUTO-ENTMCNC: 33 G/DL (ref 31.5–36.5)
MCV RBC AUTO: 99 FL (ref 78–100)
MONOCYTES # BLD AUTO: 1.1 10E9/L (ref 0–1.3)
MONOCYTES NFR BLD AUTO: 12.3 %
NEUTROPHILS # BLD AUTO: 3.8 10E9/L (ref 1.6–8.3)
NEUTROPHILS NFR BLD AUTO: 44.2 %
NONHDLC SERPL-MCNC: 48 MG/DL
PLATELET # BLD AUTO: 180 10E9/L (ref 150–450)
PROT SERPL-MCNC: 7.3 G/DL (ref 6.8–8.8)
RBC # BLD AUTO: 4.52 10E12/L (ref 4.4–5.9)
T3FREE SERPL-MCNC: 2.8 PG/ML (ref 2.3–4.2)
T4 FREE SERPL-MCNC: 0.87 NG/DL (ref 0.76–1.46)
TRIGL SERPL-MCNC: 41 MG/DL
TSH SERPL DL<=0.005 MIU/L-ACNC: 0.98 MU/L (ref 0.4–4)
WBC # BLD AUTO: 8.7 10E9/L (ref 4–11)

## 2021-04-07 PROCEDURE — 83036 HEMOGLOBIN GLYCOSYLATED A1C: CPT | Performed by: INTERNAL MEDICINE

## 2021-04-07 PROCEDURE — 80076 HEPATIC FUNCTION PANEL: CPT | Performed by: INTERNAL MEDICINE

## 2021-04-07 PROCEDURE — 85025 COMPLETE CBC W/AUTO DIFF WBC: CPT | Performed by: INTERNAL MEDICINE

## 2021-04-07 PROCEDURE — G0103 PSA SCREENING: HCPCS | Performed by: INTERNAL MEDICINE

## 2021-04-07 PROCEDURE — 84481 FREE ASSAY (FT-3): CPT | Performed by: INTERNAL MEDICINE

## 2021-04-07 PROCEDURE — 80061 LIPID PANEL: CPT | Performed by: INTERNAL MEDICINE

## 2021-04-07 PROCEDURE — 84443 ASSAY THYROID STIM HORMONE: CPT | Performed by: INTERNAL MEDICINE

## 2021-04-07 PROCEDURE — 84439 ASSAY OF FREE THYROXINE: CPT | Performed by: INTERNAL MEDICINE

## 2021-04-07 PROCEDURE — 36415 COLL VENOUS BLD VENIPUNCTURE: CPT | Performed by: INTERNAL MEDICINE

## 2021-04-08 LAB — PSA SERPL-ACNC: 5.29 UG/L (ref 0–4)

## 2021-04-13 ENCOUNTER — TELEPHONE (OUTPATIENT)
Dept: OTHER | Facility: CLINIC | Age: 81
End: 2021-04-13

## 2021-04-13 ENCOUNTER — OFFICE VISIT (OUTPATIENT)
Dept: SURGERY | Facility: CLINIC | Age: 81
End: 2021-04-13
Payer: COMMERCIAL

## 2021-04-13 VITALS
BODY MASS INDEX: 21.47 KG/M2 | SYSTOLIC BLOOD PRESSURE: 148 MMHG | HEART RATE: 84 BPM | DIASTOLIC BLOOD PRESSURE: 78 MMHG | WEIGHT: 150 LBS | OXYGEN SATURATION: 97 % | HEIGHT: 70 IN | RESPIRATION RATE: 16 BRPM

## 2021-04-13 DIAGNOSIS — Z00.00 MEDICARE ANNUAL WELLNESS VISIT, SUBSEQUENT: Primary | ICD-10-CM

## 2021-04-13 DIAGNOSIS — I10 ESSENTIAL HYPERTENSION: ICD-10-CM

## 2021-04-13 DIAGNOSIS — Z12.5 SCREENING FOR PROSTATE CANCER: ICD-10-CM

## 2021-04-13 DIAGNOSIS — E78.5 HYPERLIPIDEMIA WITH TARGET LDL LESS THAN 130: ICD-10-CM

## 2021-04-13 DIAGNOSIS — R73.01 IFG (IMPAIRED FASTING GLUCOSE): ICD-10-CM

## 2021-04-13 PROCEDURE — 99213 OFFICE O/P EST LOW 20 MIN: CPT | Mod: 25 | Performed by: INTERNAL MEDICINE

## 2021-04-13 PROCEDURE — G0439 PPPS, SUBSEQ VISIT: HCPCS | Performed by: INTERNAL MEDICINE

## 2021-04-13 RX ORDER — LOSARTAN POTASSIUM 50 MG/1
50 TABLET ORAL DAILY
Qty: 90 TABLET | Refills: 3 | Status: SHIPPED | OUTPATIENT
Start: 2021-04-13 | End: 2021-04-13

## 2021-04-13 RX ORDER — LOSARTAN POTASSIUM 50 MG/1
50 TABLET ORAL DAILY
Qty: 30 TABLET | Refills: 0 | Status: SHIPPED | OUTPATIENT
Start: 2021-04-13 | End: 2021-05-12

## 2021-04-13 RX ORDER — ATORVASTATIN CALCIUM 40 MG/1
40 TABLET, FILM COATED ORAL DAILY
Qty: 90 TABLET | Refills: 3 | Status: SHIPPED | OUTPATIENT
Start: 2021-04-13 | End: 2021-05-25

## 2021-04-13 ASSESSMENT — MIFFLIN-ST. JEOR: SCORE: 1406.65

## 2021-04-13 NOTE — PROGRESS NOTES
Ej Parra is a 80 year old male who presents for        Medicare annual wellness visit, subsequent  Hyperlipidemia with target LDL less than 130  IFG (impaired fasting glucose)  Screening for prostate cancer  Essential hypertension        HPI: Ej Parra is a 80 year old male with elevated PSA without nodules on ARTURO, IFG, and hyperlipidemia here for f/u of labs.        Review Of Systems  Skin: negative  Eyes: negative  Ears/Nose/Throat: negative  Respiratory: No shortness of breath, dyspnea on exertion, cough, or hemoptysis  Cardiovascular: negative  Gastrointestinal: negative  Genitourinary: negative  Musculoskeletal: negative  Neurologic: negative  Psychiatric: negative  Hematologic/Lymphatic/Immunologic: negative  Endocrine: negative      Current providers caring for this patient include:  Patient Care Team:  Zachary Aldridge MD as Assigned Heart and Vascular Provider    Complete Medical and Social history reviewed with patient, outlined below.    Patient Active Problem List   Diagnosis     Torticollis     Pure hyperglyceridemia     Hyperlipidemia with target LDL less than 130       Past Medical History:   Diagnosis Date     Allergic rhinitis, cause unspecified      Esophageal reflux      Hyperlipidemia LDL goal < 130        Past Surgical History:   Procedure Laterality Date     [facility] NONSPECIFIC PROCEDURE      appendectomy     [facility] NONSPECIFIC PROCEDURE  194    T&A       Family History   Problem Relation Age of Onset     Cardiovascular Father         heart valve problems,  at age 84     Circulatory Father         aortic aneurysm     Cerebrovascular Disease Father      Cardiovascular Mother          of CHF at age 94     Cerebrovascular Disease Mother         several     Cardiovascular Brother         valve replacement in his 70s     Arthritis Brother      Genetic Disorder Other         Leiden mutation/Leiden mutation       Social History     Tobacco Use     Smoking status:  "Former Smoker     Smokeless tobacco: Never Used   Substance Use Topics     Alcohol use: No       Diet: regular, low salt/low fat  Physical Activity: active without specific exercise program  Depression Screen:    Over the past 2 weeks, patient has felt down, depressed, or hopeless:  No    Over the past 2 weeks, patient has felt little interest or pleasure in doing things: No    Functional ability/Safety screen:  Up and go test (able to get up and walk longer than 30 seconds): Passed  Patient needs assistance with: nothing  Patient's home has the following possible safety concerns: none identified  Patient has concerns about his hearing:  No  Cognitive Screen  Patient repeats three objects (ball, flag, tree)      Clock drawing test:   NORMAL  Recalls three objects after 3 minutes (ball,flag,tree):                                                                                               recalls 3 objects (3 points)    Physical Exam:  BP (!) 148/78   Pulse 84   Resp 16   Ht 1.778 m (5' 10\")   Wt 68 kg (150 lb)   SpO2 97%   BMI 21.52 kg/m     Body mass index is 21.52 kg/m .              PAST MEDICAL HISTORY:                  Past Medical History:   Diagnosis Date     Allergic rhinitis, cause unspecified      Esophageal reflux      Hyperlipidemia LDL goal < 130        PAST SURGICAL HISTORY:                  Past Surgical History:   Procedure Laterality Date     [facility] NONSPECIFIC PROCEDURE  1982    appendectomy     [facility] NONSPECIFIC PROCEDURE  1947    T&A       CURRENT MEDICATIONS:                  Current Outpatient Medications   Medication Sig Dispense Refill     atorvastatin (LIPITOR) 40 MG tablet Take 1 tablet (40 mg) by mouth daily 90 tablet 3     fluorouracil (EFUDEX) 5 % cream   0     glucosamine-chondroitin 500-400 MG CAPS Take 1 capsule by mouth daily       order for DME Equipment being ordered: Ankle Air Cast Splint 1 each 0       ALLERGIES:                  Allergies   Allergen Reactions     Niaspan " [Niacin] Rash       SOCIAL HISTORY:                  Social History     Socioeconomic History     Marital status:      Spouse name: Milagros     Number of children: 2     Years of education: 15     Highest education level: Not on file   Occupational History     Employer: [employer]     Comment: worked as an  for [employer]   Social Needs     Financial resource strain: Not on file     Food insecurity     Worry: Not on file     Inability: Not on file     Transportation needs     Medical: Not on file     Non-medical: Not on file   Tobacco Use     Smoking status: Former Smoker     Smokeless tobacco: Never Used   Substance and Sexual Activity     Alcohol use: No     Drug use: No     Sexual activity: Not on file   Lifestyle     Physical activity     Days per week: Not on file     Minutes per session: Not on file     Stress: Not on file   Relationships     Social connections     Talks on phone: Not on file     Gets together: Not on file     Attends [employer] service: Not on file     Active member of club or organization: Not on file     Attends meetings of clubs or organizations: Not on file     Relationship status: Not on file     Intimate partner violence     Fear of current or ex partner: Not on file     Emotionally abused: Not on file     Physically abused: Not on file     Forced sexual activity: Not on file   Other Topics Concern     Parent/sibling w/ CABG, MI or angioplasty before 65F 55M? Not Asked   Social History Narrative     Not on file       FAMILY HISTORY:                   Family History   Problem Relation Age of Onset     Cardiovascular Father         heart valve problems,  at age 84     Circulatory Father         aortic aneurysm     Cerebrovascular Disease Father      Cardiovascular Mother          of CHF at age 94     Cerebrovascular Disease Mother         several     Cardiovascular Brother         valve replacement in his 70s     Arthritis Brother      Genetic Disorder  Other         Leiden mutation/Leiden mutation               GENERAL APPEARANCE: healthy, alert and no distress  PSYCH: Affect normal/bright.  Mentation within normal limits.  EYES: conjunctiva clear  HENT: ear canals and TM's normal.  Nose and mouth without ulcers, erythema or lesions  NECK: supple, nontender, no lymphadenopathy  RESP: lungs clear to auscultation - no rales, rhonchi or wheezes  CV: regular rates and rhythm, normal S1 S2, no murmur noted  ABDOMEN:  soft, nontender, no HSM or masses and bowel sounds normal  SKIN: no suspicious lesions or rashes  NEURO: Normal strength and tone,  normal speech and mentation  Extremities: no peripheral edema or tenderness, peripheral pulses normal  MS: extremities normal- no gross deformities noted, no erythema, FROM noted in all extremities  PSYCH: mentation appears normal  LYMPHATICS: no cervical adenopathy    End of Life Planning:   Patient currently has an advanced directive: Yes.  Practitioner is supportive of decision.    Education/Counseling:   Based on review of the above information, the following items were addressed:      Elevated blood pressure - follow-up plans made    Appropriate preventive services were discussed with this patient, including applicable screening as appropriate for cardiovascular disease, diabetes, osteopenia/osteoporosis, and glaucoma.  As appropriate for age/gender, discussed screening for colorectal cancer, prostate cancer, breast cancer, and cervical cancer.   Checklist reviewing preventive services available has been given to the patient.                   Component      Latest Ref Rng & Units 5/7/2020 4/7/2021   WBC      4.0 - 11.0 10e9/L 8.1 8.7   RBC Count      4.4 - 5.9 10e12/L 4.62 4.52   Hemoglobin      13.3 - 17.7 g/dL 14.9 14.8   Hematocrit      40.0 - 53.0 % 45.9 44.9   MCV      78 - 100 fl 99 99   MCH      26.5 - 33.0 pg 32.3 32.7   MCHC      31.5 - 36.5 g/dL 32.5 33.0   RDW      10.0 - 15.0 % 13.1 13.0   Platelet Count       150 - 450 10e9/L 188 180   % Neutrophils      % 42.0 44.2   % Lymphocytes      % 44.0 40.5   % Monocytes      % 11.7 12.3   % Eosinophils      % 2.1 2.8   % Basophils      % 0.2 0.2   Absolute Neutrophil      1.6 - 8.3 10e9/L 3.4 3.8   Absolute Lymphocytes      0.8 - 5.3 10e9/L 3.5 3.5   Absolute Monocytes      0.0 - 1.3 10e9/L 0.9 1.1   Absolute Eosinophils      0.0 - 0.7 10e9/L 0.2 0.2   Absolute Basophils      0.0 - 0.2 10e9/L 0.0 0.0   Diff Method       Automated Method Automated Method   Sodium      133 - 144 mmol/L 142    Potassium      3.4 - 5.3 mmol/L 3.9    Chloride      94 - 109 mmol/L 108    Carbon Dioxide      20 - 32 mmol/L 29    Anion Gap      3 - 14 mmol/L 5    Glucose      70 - 99 mg/dL 90    Urea Nitrogen      7 - 30 mg/dL 24    Creatinine      0.66 - 1.25 mg/dL 0.90    GFR Estimate      >60 mL/min/1.73:m2 81    GFR Estimate If Black      >60 mL/min/1.73:m2 >90    Calcium      8.5 - 10.1 mg/dL 8.7    Bilirubin Direct      0.0 - 0.2 mg/dL 0.2 0.1   Bilirubin Total      0.2 - 1.3 mg/dL 0.7 0.6   Albumin      3.4 - 5.0 g/dL 3.5 3.8   Protein Total      6.8 - 8.8 g/dL 7.6 7.3   Alkaline Phosphatase      40 - 150 U/L 62 54   ALT      0 - 70 U/L 45 42   AST      0 - 45 U/L 26 25   Cholesterol      <200 mg/dL 110 107   Triglycerides      <150 mg/dL 61 41   HDL Cholesterol      >39 mg/dL 50 59   LDL Cholesterol Calculated      <100 mg/dL 48 40   Non HDL Cholesterol      <130 mg/dL 60 48   Creatinine Urine      mg/dL 187    Albumin Urine mg/L      mg/L 13    Albumin Urine mg/g Cr      0 - 17 mg/g Cr 7.17    Free T3      2.3 - 4.2 pg/mL 3.1 2.8   T4 Free      0.76 - 1.46 ng/dL 0.88 0.87   TSH      0.40 - 4.00 mU/L 0.72 0.98   Hemoglobin A1C      0 - 5.6 % 5.5 5.5   PSA      0 - 4 ug/L 4.54 (H) 5.29 (H)           Component      Latest Ref Rng & Units 5/21/2004 7/5/2005 10/10/2006 9/17/2007   PSA      0 - 4 ug/L 1.27 2.29 1.24 1.76     Component      Latest Ref Rng & Units 11/25/2008 1/12/2010 1/21/2011  2/6/2012   PSA      0 - 4 ug/L 3.52 2.41 2.77 2.49     Component      Latest Ref Rng & Units 2/10/2014 1/30/2015 2/23/2016 3/7/2017   PSA      0 - 4 ug/L 5.15 (H) 3.83 3.90 5.13 (H)     Component      Latest Ref Rng & Units 3/5/2018 3/5/2019 5/7/2020 4/7/2021   PSA      0 - 4 ug/L 4.67 (H) 4.96 (H) 4.54 (H) 5.29 (H)         A/P:    (Z00.00) Medicare annual wellness visit, subsequent  (primary encounter diagnosis)  Comment: doing well , RTC in one year for annual physical with new PCP. The patient has been advised my practice is changing to a purely consultative Vascualr Medicine practice. As such they are advised they will need to find a new primary care provider.  I will continue to provide care for vascular risk factors including diabetes, hypertension blood, cholesterol management, aneurysmal , or thrombosis related issues.    Patient understands the need to establish care with a new provider for routine screenings, ill visits, referrals, and other non-vascular health care issues.     Plan: I am tailoring my practice to consultative Vascualr Medicine. Nonetheless, I will address the prostate issue until he has a new PCP. See below. COlon cancer screening no longer indcated based upon age.     (E78.5) Hyperlipidemia with target LDL less than 130  Comment: at goal, no med changes  Plan: atorvastatin (LIPITOR) 40 MG tablet,         OFFICE/OUTPT VISIT,EST,LEVL III           (R73.01) IFG (impaired fasting glucose)  Comment: avoid CHO  Plan: OFFICE/OUTPT VISIT,EST,LEVL III           (Z12.5) Screening for prostate cancer  Comment: increasing as above. Check free and total PSA in next week, if high likelihood of prostate cancer, check MR proste and RTC to discuss results and need for  referral.  Plan: PSA total and free, OFFICE/OUTPT VISIT,EST,LEVL        III           (I10) Essential hypertension  Comment: start losartan 50 daily, RTC one month for a BP check  Plan: OFFICE/OUTPT VISIT,EST,LEVL III            Greater  than one half the 15 minutes not spent on preventive care during this visit was spent providing aducation and counselling regarding item(s) # 2 onward as delineated above.

## 2021-04-14 ENCOUNTER — TELEPHONE (OUTPATIENT)
Dept: OTHER | Facility: CLINIC | Age: 81
End: 2021-04-14

## 2021-04-14 NOTE — TELEPHONE ENCOUNTER
Scheduled as follows:    [facility]  5/25/2021 Dr. Oly PATEL

## 2021-04-14 NOTE — TELEPHONE ENCOUNTER
Follow up to 4/13/21 @ [facility]    Please arrange for:      Fasting labs week of 4/19/21    In clinic visit for BP check in one month.    Joanna Loza RN BSN  [facility]  [phone]

## 2021-04-22 DIAGNOSIS — R73.01 IFG (IMPAIRED FASTING GLUCOSE): ICD-10-CM

## 2021-04-22 DIAGNOSIS — Z12.5 SCREENING FOR PROSTATE CANCER: ICD-10-CM

## 2021-04-22 DIAGNOSIS — I10 ESSENTIAL HYPERTENSION: ICD-10-CM

## 2021-04-22 LAB
ANION GAP SERPL CALCULATED.3IONS-SCNC: 2 MMOL/L (ref 3–14)
BUN SERPL-MCNC: 23 MG/DL (ref 7–30)
CALCIUM SERPL-MCNC: 9 MG/DL (ref 8.5–10.1)
CHLORIDE SERPL-SCNC: 105 MMOL/L (ref 94–109)
CO2 SERPL-SCNC: 32 MMOL/L (ref 20–32)
CREAT SERPL-MCNC: 1.03 MG/DL (ref 0.66–1.25)
GFR SERPL CREATININE-BSD FRML MDRD: 69 ML/MIN/{1.73_M2}
GLUCOSE SERPL-MCNC: 97 MG/DL (ref 70–99)
POTASSIUM SERPL-SCNC: 4 MMOL/L (ref 3.4–5.3)
SODIUM SERPL-SCNC: 139 MMOL/L (ref 133–144)

## 2021-04-22 PROCEDURE — 36415 COLL VENOUS BLD VENIPUNCTURE: CPT | Performed by: INTERNAL MEDICINE

## 2021-04-22 PROCEDURE — 84153 ASSAY OF PSA TOTAL: CPT | Mod: 90 | Performed by: INTERNAL MEDICINE

## 2021-04-22 PROCEDURE — 84154 ASSAY OF PSA FREE: CPT | Mod: 90 | Performed by: INTERNAL MEDICINE

## 2021-04-22 PROCEDURE — 99000 SPECIMEN HANDLING OFFICE-LAB: CPT | Performed by: INTERNAL MEDICINE

## 2021-04-22 PROCEDURE — 80048 BASIC METABOLIC PNL TOTAL CA: CPT | Performed by: INTERNAL MEDICINE

## 2021-04-24 LAB
PSA FREE MFR SERPL: 25 %
PSA FREE SERPL-MCNC: 1.4 NG/ML
PSA SERPL-MCNC: 5.6 NG/ML (ref 0–4)

## 2021-05-12 DIAGNOSIS — I10 ESSENTIAL HYPERTENSION: ICD-10-CM

## 2021-05-12 RX ORDER — LOSARTAN POTASSIUM 50 MG/1
50 TABLET ORAL DAILY
Qty: 30 TABLET | Refills: 0 | Status: SHIPPED | OUTPATIENT
Start: 2021-05-12 | End: 2021-05-25

## 2021-05-12 NOTE — TELEPHONE ENCOUNTER
Patient called regarding his losartan. He states that he has been waiting for this [facility] to [facility] but it has not been yet. He has only 1 pill remaining and would like to know if he can have a temporary prescription sent to the [facility] in [place]. He can be reached at [phone]

## 2021-05-12 NOTE — TELEPHONE ENCOUNTER
losartan (COZAAR) 50 MG tablet    Rx 4/13/21 Q30 R0  LOV 4/13/21    30 day rx loaded for review/signature to Phillip.    Unable to fill per [facility] [place].  Medication and pharmacy loaded.    Joanna Loza RN BSN  [facility]  [phone]

## 2021-05-25 ENCOUNTER — OFFICE VISIT (OUTPATIENT)
Dept: SURGERY | Facility: CLINIC | Age: 81
End: 2021-05-25
Attending: INTERNAL MEDICINE
Payer: COMMERCIAL

## 2021-05-25 VITALS
DIASTOLIC BLOOD PRESSURE: 70 MMHG | WEIGHT: 150 LBS | OXYGEN SATURATION: 97 % | HEIGHT: 70 IN | SYSTOLIC BLOOD PRESSURE: 120 MMHG | BODY MASS INDEX: 21.47 KG/M2 | HEART RATE: 91 BPM | RESPIRATION RATE: 16 BRPM

## 2021-05-25 DIAGNOSIS — Z00.00 MEDICARE ANNUAL WELLNESS VISIT, SUBSEQUENT: Primary | ICD-10-CM

## 2021-05-25 DIAGNOSIS — Z12.5 SCREENING FOR PROSTATE CANCER: ICD-10-CM

## 2021-05-25 DIAGNOSIS — R97.20 ELEVATED PROSTATE SPECIFIC ANTIGEN (PSA): ICD-10-CM

## 2021-05-25 DIAGNOSIS — E78.5 HYPERLIPIDEMIA WITH TARGET LDL LESS THAN 130: ICD-10-CM

## 2021-05-25 DIAGNOSIS — R73.01 IFG (IMPAIRED FASTING GLUCOSE): ICD-10-CM

## 2021-05-25 DIAGNOSIS — I10 ESSENTIAL HYPERTENSION: ICD-10-CM

## 2021-05-25 PROCEDURE — 99213 OFFICE O/P EST LOW 20 MIN: CPT | Mod: 25 | Performed by: INTERNAL MEDICINE

## 2021-05-25 RX ORDER — ATORVASTATIN CALCIUM 40 MG/1
40 TABLET, FILM COATED ORAL DAILY
Qty: 90 TABLET | Refills: 3 | Status: SHIPPED | OUTPATIENT
Start: 2021-05-25

## 2021-05-25 RX ORDER — LOSARTAN POTASSIUM 50 MG/1
50 TABLET ORAL DAILY
Qty: 90 TABLET | Refills: 3 | Status: SHIPPED | OUTPATIENT
Start: 2021-05-25

## 2021-05-25 ASSESSMENT — MIFFLIN-ST. JEOR: SCORE: 1401.65

## 2021-05-25 NOTE — PROGRESS NOTES
Ej Parra is a 81 year old male who presents for        Medicare annual wellness visit, subsequent  Hyperlipidemia with target LDL less than 130  IFG (impaired fasting glucose)  Screening for prostate cancer  Essential hypertension      HPI: Ej Parra is a 81 year old male w/ HLD, htn, IFG, recently elevated PSA who presents for annnual physical as well as to address management of the above issues and review relevant annual lab work directly related to those conditions. He is doing well in general w/o complaints on a dialy basis related to the above conditions. His PSA has been climbing. His free PSA reflects a 30% chance of malignancy. He has had no nodules in the past on his DREs.        Review Of Systems  Skin: negative  Eyes: negative  Ears/Nose/Throat: negative  Respiratory: No shortness of breath, dyspnea on exertion, cough, or hemoptysis  Cardiovascular: negative  Gastrointestinal: negative  Genitourinary: negative  Musculoskeletal: negative  Neurologic: negative  Psychiatric: negative  Hematologic/Lymphatic/Immunologic: negative  Endocrine: negative     Current providers caring for this patient include:  Patient Care Team:  No Ref-Primary, Physician as PCP - Zachary Sargent MD as Assigned Heart and Vascular Provider    Complete Medical and Social history reviewed with patient, outlined below.    Patient Active Problem List   Diagnosis     Torticollis     Pure hyperglyceridemia     Hyperlipidemia with target LDL less than 130       Past Medical History:   Diagnosis Date     Allergic rhinitis, cause unspecified      Esophageal reflux      Hyperlipidemia LDL goal < 130        Past Surgical History:   Procedure Laterality Date     [facility] NONSPECIFIC PROCEDURE      appendectomy     [facility] NONSPECIFIC PROCEDURE      T&A       Family History   Problem Relation Age of Onset     Cardiovascular Father         heart valve problems,  at age 84     Circulatory Father          "aortic aneurysm     Cerebrovascular Disease Father      Cardiovascular Mother          of CHF at age 94     Cerebrovascular Disease Mother         several     Cardiovascular Brother         valve replacement in his 70s     Arthritis Brother      Genetic Disorder Other         Leiden mutation/Leiden mutation       Social History     Tobacco Use     Smoking status: Former Smoker     Smokeless tobacco: Never Used   Substance Use Topics     Alcohol use: No       Diet: regular, low salt/low fat  Physical Activity: active without specific exercise program  Depression Screen:    Over the past 2 weeks, patient has felt down, depressed, or hopeless:  No    Over the past 2 weeks, patient has felt little interest or pleasure in doing things: No    Functional ability/Safety screen:  Up and go test (able to get up and walk longer than 30 seconds): Passed  Patient needs assistance with: nothing  Patient's home has the following possible safety concerns: none identified  Patient has concerns about his hearing:  No  Cognitive Screen  Patient repeats three objects (ball, flag, tree)      Clock drawing test: NORMAL  Recalls three objects after 3 minutes (ball,flag,tree):                                                                                               recalls 3 objects (3 points)    Physical Exam:  BP (!) 142/74   Pulse 91   Resp 16   Ht 1.778 m (5' 10\")   Wt 68 kg (150 lb)   SpO2 97%   PF (!) 20 L/min   BMI 21.52 kg/m     Body mass index is 21.52 kg/m .                GENERAL APPEARANCE: healthy, alert and no distress  PSYCH: Affect normal/bright.  Mentation within normal limits.  EYES: conjunctiva clear  HENT: ear canals and TM's normal.  Nose and mouth without ulcers, erythema or lesions  NECK: supple, nontender, no lymphadenopathy  RESP: lungs clear to auscultation - no rales, rhonchi or wheezes  CV: regular rates and rhythm, normal S1 S2, no murmur noted  ABDOMEN:  soft, nontender, no HSM or masses and " bowel sounds normal  SKIN: no suspicious lesions or rashes  NEURO: Normal strength and tone,  normal speech and mentation  Rectal exam: prostate 2+ enlarged with a left paramedian nodule  Extremities: no peripheral edema or tenderness, peripheral pulses normal  MS: extremities normal- no gross deformities noted, no erythema, FROM noted in all extremities  PSYCH: mentation appears normal  LYMPHATICS: no cervical adenopathy    End of Life Planning:   Patient currently has an advanced directive: Yes.  Practitioner is supportive of decision.    Education/Counseling:   Based on review of the above information, the following items were addressed:      Elevated blood pressure - follow-up plans made    Appropriate preventive services were discussed with this patient, including applicable screening as appropriate for cardiovascular disease, diabetes, osteopenia/osteoporosis, and glaucoma.  As appropriate for age/gender, discussed screening for colorectal cancer, prostate cancer, breast cancer, and cervical cancer.   Checklist reviewing preventive services available has been given to the patient.                 Component      Latest Ref Rng & Units 5/7/2020 4/7/2021 4/22/2021   WBC      4.0 - 11.0 10e9/L 8.1 8.7    RBC Count      4.4 - 5.9 10e12/L 4.62 4.52    Hemoglobin      13.3 - 17.7 g/dL 14.9 14.8    Hematocrit      40.0 - 53.0 % 45.9 44.9    MCV      78 - 100 fl 99 99    MCH      26.5 - 33.0 pg 32.3 32.7    MCHC      31.5 - 36.5 g/dL 32.5 33.0    RDW      10.0 - 15.0 % 13.1 13.0    Platelet Count      150 - 450 10e9/L 188 180    % Neutrophils      % 42.0 44.2    % Lymphocytes      % 44.0 40.5    % Monocytes      % 11.7 12.3    % Eosinophils      % 2.1 2.8    % Basophils      % 0.2 0.2    Absolute Neutrophil      1.6 - 8.3 10e9/L 3.4 3.8    Absolute Lymphocytes      0.8 - 5.3 10e9/L 3.5 3.5    Absolute Monocytes      0.0 - 1.3 10e9/L 0.9 1.1    Absolute Eosinophils      0.0 - 0.7 10e9/L 0.2 0.2    Absolute Basophils       0.0 - 0.2 10e9/L 0.0 0.0    Diff Method       Automated Method Automated Method    Sodium      133 - 144 mmol/L 142  139   Potassium      3.4 - 5.3 mmol/L 3.9  4.0   Chloride      94 - 109 mmol/L 108  105   Carbon Dioxide      20 - 32 mmol/L 29  32   Anion Gap      3 - 14 mmol/L 5  2 (L)   Glucose      70 - 99 mg/dL 90  97   Urea Nitrogen      7 - 30 mg/dL 24  23   Creatinine      0.66 - 1.25 mg/dL 0.90  1.03   GFR Estimate      >60 mL/min/1.73:m2 81  69   GFR Estimate If Black      >60 mL/min/1.73:m2 >90  80   Calcium      8.5 - 10.1 mg/dL 8.7  9.0   Bilirubin Direct      0.0 - 0.2 mg/dL 0.2 0.1    Bilirubin Total      0.2 - 1.3 mg/dL 0.7 0.6    Albumin      3.4 - 5.0 g/dL 3.5 3.8    Protein Total      6.8 - 8.8 g/dL 7.6 7.3    Alkaline Phosphatase      40 - 150 U/L 62 54    ALT      0 - 70 U/L 45 42    AST      0 - 45 U/L 26 25    Cholesterol      <200 mg/dL 110 107    Triglycerides      <150 mg/dL 61 41    HDL Cholesterol      >39 mg/dL 50 59    LDL Cholesterol Calculated      <100 mg/dL 48 40    Non HDL Cholesterol      <130 mg/dL 60 48    Creatinine Urine      mg/dL 187     Albumin Urine mg/L      mg/L 13     Albumin Urine mg/g Cr      0 - 17 mg/g Cr 7.17     PSA Free      ng/mL   1.4   Prostate Specific Antigen      0.0 - 4.0 ng/mL   5.6 (H)   PSAPCT      %   25   Free T3      2.3 - 4.2 pg/mL 3.1 2.8    T4 Free      0.76 - 1.46 ng/dL 0.88 0.87    TSH      0.40 - 4.00 mU/L 0.72 0.98    Hemoglobin A1C      0 - 5.6 % 5.5 5.5    PSA      0 - 4 ug/L 4.54 (H) 5.29 (H)      Component      Latest Ref Rng & Units 4/24/2003 5/21/2004 7/5/2005 10/10/2006   PSA      0 - 4 ug/L 1.7 1.27 2.29 1.24   PSA Free      ng/mL       Prostate Specific Antigen      0.0 - 4.0 ng/mL       PSAPCT      %         Component      Latest Ref Rng & Units 9/17/2007 11/25/2008 1/12/2010 1/21/2011   PSA      0 - 4 ug/L 1.76 3.52 2.41 2.77   PSA Free      ng/mL       Prostate Specific Antigen      0.0 - 4.0 ng/mL       PSAPCT      %          Component      Latest Ref Rng & Units 2/6/2012 2/10/2014 5/14/2014 1/30/2015   PSA      0 - 4 ug/L 2.49 5.15 (H) 3.38 3.83   PSA Free      ng/mL       Prostate Specific Antigen      0.0 - 4.0 ng/mL       PSAPCT      %         Component      Latest Ref Rng & Units 2/23/2016 3/7/2017 8/1/2017 3/5/2018 3/5/2019   PSA      0 - 4 ug/L 3.90 5.13 (H) 4.69 (H) 4.67 (H) 4.96 (H)   PSA Free      ng/mL        Prostate Specific Antigen      0.0 - 4.0 ng/mL        PSAPCT      %          Component      Latest Ref Rng & Units 5/7/2020 4/7/2021 4/22/2021   PSA      0 - 4 ug/L 4.54 (H) 5.29 (H)    PSA Free      ng/mL   1.4   Prostate Specific Antigen      0.0 - 4.0 ng/mL   5.6 (H)   PSAPCT      %   25         A/P:      (Z00.00) Medicare annual wellness visit, subsequent  (primary encounter diagnosis)  Comment: doing well, RTC one year for annual exam  Plan: CBC with platelets differential           (E78.5) Hyperlipidemia with target LDL less than 130  Comment: at goal, no med changes  Plan: OFFICE/OUTPT VISIT,EST,LEVL III, atorvastatin         (LIPITOR) 40 MG tablet, CANCELED: T3 Free,         CANCELED: T4 free, CANCELED: TSH, CANCELED:         Hepatic panel, CANCELED: Lipid Profile            (R73.01) IFG (impaired fasting glucose)  Comment: avoid CHO, doing well  Plan: OFFICE/OUTPT VISIT,EST,LEVL III, CANCELED:         Hemoglobin A1c           (I10) Essential hypertension  Comment: at goal  Plan: OFFICE/OUTPT VISIT,EST,LEVL III, losartan         (COZAAR) 50 MG tablet          (R97.20) Elevated prostate specific antigen (PSA)  Comment: 30% CHANCE OF MALIGNANCY BASED UPON FREE PSA, new nodule noted, check the below, RTC one week later to discuss  Plan: MR Prostate wo & w Contrast           (Z12.5) Screening for prostate cancer  Plan: OFFICE/OUTPT VISIT,EST,LEVL III, MR Prostate wo        & w Contrast              20 minutes not spent on preventive care during this visit was spent reviewing labs and the plan regarding item(s) # 2  onward as delineated above.

## 2021-05-26 ENCOUNTER — TELEPHONE (OUTPATIENT)
Dept: OTHER | Facility: CLINIC | Age: 81
End: 2021-05-26

## 2021-05-26 NOTE — TELEPHONE ENCOUNTER
Follow up to 5/25/21    Please arrange for:      MR Prostate with Contrast in the next month    Follow up in clinic one week later.    Information for scheduling the MR Prostate in the following note.    Joanna Loza RN BSN  [facility]  [phone]

## 2021-05-26 NOTE — TELEPHONE ENCOUNTER
Results for ELIDA PERRIN (MRN [id number]) as of 2021 16:58   Ref. Range 2020 11:16   Creatinine Latest Ref Range: 0.66 - 1.25 mg/dL 0.90   GFR Estimate Latest Ref Range: >60 mL/min/1.73_m2 81       MR prostate     Through [facility]:    [address] [place] [address]     [phone]           Procedure involves IV contrast, small NG tube placed in rectum.  Takes about 42 minutes         If patient needs sedation, it must be ordered by the provider and they must arrive an hour early.           [facility] locations that have C3T MRI for MR Prostate include:          [facility] [place], [place]   [address], [address]  [place] [address]     [facility] [place]  [address]  [place] [address]    [place]  [address], [address]  [place] [address]       For all locations:  Phone [phone] Fax [phone]

## 2021-05-27 NOTE — TELEPHONE ENCOUNTER
Patient is scheduled for his MRI on 06/25/21 and follow up visit with Dr Aldridge on 07/13/21 in [place].

## 2021-06-25 ENCOUNTER — ANCILLARY PROCEDURE (OUTPATIENT)
Dept: MRI IMAGING | Facility: CLINIC | Age: 81
End: 2021-06-25
Attending: INTERNAL MEDICINE
Payer: COMMERCIAL

## 2021-06-25 DIAGNOSIS — R97.20 ELEVATED PROSTATE SPECIFIC ANTIGEN (PSA): ICD-10-CM

## 2021-06-25 DIAGNOSIS — Z12.5 SCREENING FOR PROSTATE CANCER: ICD-10-CM

## 2021-06-25 PROCEDURE — A9585 GADOBUTROL INJECTION: HCPCS | Performed by: RADIOLOGY

## 2021-06-25 PROCEDURE — 72197 MRI PELVIS W/O & W/DYE: CPT | Performed by: RADIOLOGY

## 2021-06-25 RX ORDER — GADOBUTROL 604.72 MG/ML
7.5 INJECTION INTRAVENOUS ONCE
Status: COMPLETED | OUTPATIENT
Start: 2021-06-25 | End: 2021-06-25

## 2021-06-25 RX ADMIN — GADOBUTROL 7.5 ML: 604.72 INJECTION INTRAVENOUS at 16:55

## 2021-06-25 NOTE — DISCHARGE INSTRUCTIONS
MRI Contrast Discharge Instructions    The IV contrast you received today will pass out of your body in your  urine. This will happen in the next 24 hours. You will not feel this process.  Your urine will not change color.    Drink at least 4 extra glasses of water or juice today (unless your doctor  has restricted your fluids). This reduces the stress on your kidneys.  You may take your regular medicines.    If you are on dialysis: It is best to have dialysis today.    If you have a reaction: Most reactions happen right away. If you have  any new symptoms after leaving the hospital (such as hives or swelling),  call your hospital at the correct number below. Or call your family doctor.  If you have breathing distress or wheezing, call 911.    Special instructions: ***    I have read and understand the above information.    Signature:______________________________________ Date:___________    Staff:__________________________________________ Date:___________     Time:__________    [facility] Departments:    ___[place]: [phone]  ___[facility]: [phone]  ___[place]: [phone] ___[facility]: [phone]  ___[facility]: [phone]  ___[facility]: [phone]  ___[phone]  ___[facility]: [phone]  ___[phone]

## 2021-07-13 ENCOUNTER — OFFICE VISIT (OUTPATIENT)
Dept: SURGERY | Facility: CLINIC | Age: 81
End: 2021-07-13
Attending: INTERNAL MEDICINE
Payer: COMMERCIAL

## 2021-07-13 VITALS
HEART RATE: 96 BPM | RESPIRATION RATE: 16 BRPM | HEIGHT: 70 IN | SYSTOLIC BLOOD PRESSURE: 152 MMHG | BODY MASS INDEX: 21.47 KG/M2 | WEIGHT: 150 LBS | DIASTOLIC BLOOD PRESSURE: 72 MMHG | OXYGEN SATURATION: 97 %

## 2021-07-13 DIAGNOSIS — R97.20 ELEVATED PROSTATE SPECIFIC ANTIGEN (PSA): ICD-10-CM

## 2021-07-13 PROCEDURE — 99214 OFFICE O/P EST MOD 30 MIN: CPT | Performed by: INTERNAL MEDICINE

## 2021-07-13 ASSESSMENT — MIFFLIN-ST. JEOR: SCORE: 1401.65

## 2021-07-13 NOTE — PROGRESS NOTES
Ej Parra is a 81 year old male who is presenting at the current time to discuss his diagnosi(es) of     Elevated prostate specific antigen (PSA)  .      HPI: Ej Parra is a 81 year old male w/ recently elevated PSA and free PSA percentage who presents  to address management of the above issue and review his MR of the prostate which revealed a PIRADS 4 nodule. Clinically significant cancer is therefore likely to be present. He is doing well in general w/o complaints on a dialy basis related to the above conditions.  He has had no nodules in the past on his DREs.          Review Of Systems  Skin: negative  Eyes: negative  Ears/Nose/Throat: negative  Respiratory: No shortness of breath, dyspnea on exertion, cough, or hemoptysis  Cardiovascular: negative  Gastrointestinal: negative  Genitourinary: negative  Musculoskeletal: negative  Neurologic: negative  Psychiatric: negative  Hematologic/Lymphatic/Immunologic: negative  Endocrine: negative      PAST MEDICAL HISTORY:                  Past Medical History:   Diagnosis Date     Allergic rhinitis, cause unspecified      Esophageal reflux      Hyperlipidemia LDL goal < 130        PAST SURGICAL HISTORY:                  Past Surgical History:   Procedure Laterality Date     [facility] NONSPECIFIC PROCEDURE  1982    appendectomy     [facility] NONSPECIFIC PROCEDURE  1947    T&A       CURRENT MEDICATIONS:                  Current Outpatient Medications   Medication Sig Dispense Refill     atorvastatin (LIPITOR) 40 MG tablet Take 1 tablet (40 mg) by mouth daily 90 tablet 3     fluorouracil (EFUDEX) 5 % cream   0     glucosamine-chondroitin 500-400 MG CAPS Take 1 capsule by mouth daily       losartan (COZAAR) 50 MG tablet Take 1 tablet (50 mg) by mouth daily 90 tablet 3       ALLERGIES:                  Allergies   Allergen Reactions     Niaspan [Niacin] Rash       SOCIAL HISTORY:                  Social History     Socioeconomic History     Marital status:       Spouse name: Milagros     Number of children: 2     Years of education: 15     Highest education level: Not on file   Occupational History     Employer: [employer]     Comment: worked as an  for [employer]   Tobacco Use     Smoking status: Former Smoker     Smokeless tobacco: Never Used   Substance and Sexual Activity     Alcohol use: No     Drug use: No     Sexual activity: Not on file   Other Topics Concern     Parent/sibling w/ CABG, MI or angioplasty before 65F 55M? Not Asked   Social History Narrative     Not on file     Social Determinants of Health     Financial Resource Strain:      Difficulty of Paying Living Expenses:    Food Insecurity:      Worried About Running Out of Food in the Last Year:      Ran Out of Food in the Last Year:    Transportation Needs:      Lack of Transportation (Medical):      Lack of Transportation (Non-Medical):    Physical Activity:      Days of Exercise per Week:      Minutes of Exercise per Session:    Stress:      Feeling of Stress :    Social Connections:      Frequency of Communication with Friends and Family:      Frequency of Social Gatherings with Friends and Family:      Attends Gnosticist Services:      Active Member of Clubs or Organizations:      Attends Club or Organization Meetings:      Marital Status:    Intimate Partner Violence:      Fear of Current or Ex-Partner:      Emotionally Abused:      Physically Abused:      Sexually Abused:        FAMILY HISTORY:                   Family History   Problem Relation Age of Onset     Cardiovascular Father         heart valve problems,  at age 84     Circulatory Father         aortic aneurysm     Cerebrovascular Disease Father      Cardiovascular Mother          of CHF at age 94     Cerebrovascular Disease Mother         several     Cardiovascular Brother         valve replacement in his 70s     Arthritis Brother      Genetic Disorder Other         Leiden mutation/Leiden mutation         Physical  exam Reveals:    O/P: WNL  HEENT: WNL  NECK: No JVD, thyromegaly, or lymphadenopathy  HEART: RRR, no murmurs, gallops, or rubs  LUNGS: CTA bilaterally without rales, wheezes, or rhonchi  GI: NABS, nondistended, nontender, soft  EXT:without cyanosis, clubbing, or edema  NEURO: nonfocal  : no flank tenderness      MRI PROSTATE: 6/25/2021 5:38 PM     CLINICAL HISTORY: Screening for prostate cancer; Elevated prostate  specific antigen (PSA)      Most Recent PSA: 5.6 ug/L     Comparison: No similar study.     TECHNIQUE:  The following sequences were obtained: High-resolution axial  T2-weighted, coronal T2-weighted, 3D volumetric T2-weighted, axial  pre-contrast T1, axial diffusion-weighted, axial apparent diffusion  coefficient and axial dynamic contrast-enhanced T1. Postcontrast  images were evaluated on a separate workstation to evaluate dynamic  contrast enhancement. The technique of this exam is PI-RADS v2.1  compliant. Contrast dose: 7.5 mL Gadavist     FINDINGS:  Size: 74 grams  Hemorrhage: Absent  Peripheral zone: Heterogeneous on T2-weighted images. Suspicious  lesions as detailed below.  Transition zone: Enlarged with BPH changes. Transition zone nodules  which are circumscribed or mostly encapsulated without diffusion  restriction.  PI-RADS 2.  No highly suspicious nodules.     Lesion(s) in rank order of severity (highest score- to lowest score,  then by size)      Lesion 1:  Location: Left mid gland peripheral zone at the 5 o'clock position  relative to the urethra. Series 7 image 58.   Additional prostate regions involved: None  Size: 12 mm  T2 description: T2 hypointense focus  T2 numerical assessment: 4  DWI description: Restriction on DWI and low signal on ADC  DWI numerical assessment: 4  DCE assessment: Positive    Prostate margin: Capsular abutment 6-15 mm  Lesion overall PI-RADS category: 4     Neurovascular bundles: No neurovascular bundle involvement by  malignancy.  Seminal vesicles: No seminal  vesicle involvement by malignancy.  Lymph nodes: No lymph node involvement  Bones: No suspicious lesions  Other pelvic organs: Rectal tube. Mild urinary bladder wall thickening  with trabeculation likely due to chronic outlet obstructive changes.  Enthesopathic changes of the pelvis and hips. Colonic diverticulosis.  No associated MRI findings concerning for acute diverticulitis.                                                                         IMPRESSION:  1. Based on the most suspicious abnormality, this exam is  characterized as PIRADS 4 - Clinically significant cancer is likely to  be present.  The most suspicious abnormality is located at the left  mid gland peripheral zone, 5:00 position and there is short segment  capsular abutment with low likelihood of minimal extraprostatic  extension.  2. No suspicious adenopathy or evidence of pelvic metastases.        PIRADS? v2.1 Assessment Categories   PIRADS 1: Very low (clinically significant cancer is highly unlikely  to be present)   PIRADS 2: Low (clinically significant cancer is unlikely to be  present)   PIRADS 3: Intermediate (the presence of clinically significant cancer  is equivocal)   PIRADS 4: High (clinically significant cancer is likely to be present)     PIRADS 5: Very high (clinically significant cancer is highly likely to  be present)              MAAME WELLS MD      A/P:    (R97.20) Elevated PSA w/ PIRADS-4 nodule seen on 6/25/2021 MR prostate  Comment: 30% chance of malignancy based upon free PSA, new nodule noted, w/ PIRADS-4 nodule seen on 6/25/2021 MR prostate  Plan: Adult Urology Referral to Dr. Cuauhtemoc Rodriguez to discuss whether he should just be monitored , or should seek active treatment (either hormonal, radiologic, surgical, or a combination of the above). He, at the age of 81 is not expected to live an additional 25 years.     30 minutes spent reviewing PSA trend, imaging results, and discussing the above plan with the  patient.

## 2021-08-13 NOTE — TELEPHONE ENCOUNTER
[facility], [facility] pressure losartan (COZAAR) will not be refilled until 2-4 weeks. Patient is wondering if Dr. Aldridge can send 1 month supply to [facility] in [place], telephone number [phone]. Please call patient back to confirm of this request.    Informed patient will send message to nurse for call back.       Theresa GARCIA   
Refill sent.  Patient informed.  Joanna Loza RN BSN  [facility]  [phone]      
No

## 2021-09-04 ENCOUNTER — HEALTH MAINTENANCE LETTER (OUTPATIENT)
Age: 81
End: 2021-09-04

## 2022-08-06 ENCOUNTER — HEALTH MAINTENANCE LETTER (OUTPATIENT)
Age: 82
End: 2022-08-06

## 2022-10-22 ENCOUNTER — HEALTH MAINTENANCE LETTER (OUTPATIENT)
Age: 82
End: 2022-10-22

## 2023-08-27 ENCOUNTER — HEALTH MAINTENANCE LETTER (OUTPATIENT)
Age: 83
End: 2023-08-27

## 2024-10-19 ENCOUNTER — HEALTH MAINTENANCE LETTER (OUTPATIENT)
Age: 84
End: 2024-10-19